# Patient Record
Sex: MALE | Race: BLACK OR AFRICAN AMERICAN | NOT HISPANIC OR LATINO | URBAN - METROPOLITAN AREA
[De-identification: names, ages, dates, MRNs, and addresses within clinical notes are randomized per-mention and may not be internally consistent; named-entity substitution may affect disease eponyms.]

---

## 2021-10-20 ENCOUNTER — INPATIENT (INPATIENT)
Facility: HOSPITAL | Age: 51
LOS: 1 days | Discharge: HOME | DRG: 65 | End: 2021-10-22
Attending: INTERNAL MEDICINE | Admitting: INTERNAL MEDICINE
Payer: MEDICAID

## 2021-10-20 VITALS
HEIGHT: 69 IN | DIASTOLIC BLOOD PRESSURE: 96 MMHG | OXYGEN SATURATION: 98 % | TEMPERATURE: 98 F | RESPIRATION RATE: 18 BRPM | SYSTOLIC BLOOD PRESSURE: 180 MMHG | WEIGHT: 225.09 LBS | HEART RATE: 98 BPM

## 2021-10-20 DIAGNOSIS — I63.9 CEREBRAL INFARCTION, UNSPECIFIED: ICD-10-CM

## 2021-10-20 DIAGNOSIS — E11.9 TYPE 2 DIABETES MELLITUS WITHOUT COMPLICATIONS: ICD-10-CM

## 2021-10-20 DIAGNOSIS — Z29.9 ENCOUNTER FOR PROPHYLACTIC MEASURES, UNSPECIFIED: ICD-10-CM

## 2021-10-20 DIAGNOSIS — I16.0 HYPERTENSIVE URGENCY: ICD-10-CM

## 2021-10-20 DIAGNOSIS — N17.9 ACUTE KIDNEY FAILURE, UNSPECIFIED: ICD-10-CM

## 2021-10-20 DIAGNOSIS — I63.81 OTHER CEREBRAL INFARCTION DUE TO OCCLUSION OR STENOSIS OF SMALL ARTERY: ICD-10-CM

## 2021-10-20 DIAGNOSIS — I10 ESSENTIAL (PRIMARY) HYPERTENSION: ICD-10-CM

## 2021-10-20 LAB
ALBUMIN SERPL ELPH-MCNC: 2.8 G/DL — LOW (ref 3.3–5)
ALP SERPL-CCNC: 119 U/L — SIGNIFICANT CHANGE UP (ref 40–120)
ALT FLD-CCNC: 19 U/L — SIGNIFICANT CHANGE UP (ref 12–78)
ANION GAP SERPL CALC-SCNC: 5 MMOL/L — SIGNIFICANT CHANGE UP (ref 5–17)
APTT BLD: 28.6 SEC — SIGNIFICANT CHANGE UP (ref 27.5–35.5)
AST SERPL-CCNC: 12 U/L — LOW (ref 15–37)
BASOPHILS # BLD AUTO: 0.04 K/UL — SIGNIFICANT CHANGE UP (ref 0–0.2)
BASOPHILS NFR BLD AUTO: 0.7 % — SIGNIFICANT CHANGE UP (ref 0–2)
BILIRUB SERPL-MCNC: 0.2 MG/DL — SIGNIFICANT CHANGE UP (ref 0.2–1.2)
BUN SERPL-MCNC: 22 MG/DL — SIGNIFICANT CHANGE UP (ref 7–23)
CALCIUM SERPL-MCNC: 9.3 MG/DL — SIGNIFICANT CHANGE UP (ref 8.5–10.1)
CHLORIDE SERPL-SCNC: 103 MMOL/L — SIGNIFICANT CHANGE UP (ref 96–108)
CK MB CFR SERPL CALC: <1 NG/ML — SIGNIFICANT CHANGE UP (ref 0–3.6)
CO2 SERPL-SCNC: 30 MMOL/L — SIGNIFICANT CHANGE UP (ref 22–31)
CREAT SERPL-MCNC: 1.8 MG/DL — HIGH (ref 0.5–1.3)
EOSINOPHIL # BLD AUTO: 0.18 K/UL — SIGNIFICANT CHANGE UP (ref 0–0.5)
EOSINOPHIL NFR BLD AUTO: 3.1 % — SIGNIFICANT CHANGE UP (ref 0–6)
GLUCOSE SERPL-MCNC: 286 MG/DL — HIGH (ref 70–99)
HCT VFR BLD CALC: 34.3 % — LOW (ref 39–50)
HGB BLD-MCNC: 11.7 G/DL — LOW (ref 13–17)
IMM GRANULOCYTES NFR BLD AUTO: 0.2 % — SIGNIFICANT CHANGE UP (ref 0–1.5)
INR BLD: 1.02 RATIO — SIGNIFICANT CHANGE UP (ref 0.88–1.16)
LYMPHOCYTES # BLD AUTO: 1.64 K/UL — SIGNIFICANT CHANGE UP (ref 1–3.3)
LYMPHOCYTES # BLD AUTO: 28.4 % — SIGNIFICANT CHANGE UP (ref 13–44)
MCHC RBC-ENTMCNC: 27.6 PG — SIGNIFICANT CHANGE UP (ref 27–34)
MCHC RBC-ENTMCNC: 34.1 GM/DL — SIGNIFICANT CHANGE UP (ref 32–36)
MCV RBC AUTO: 80.9 FL — SIGNIFICANT CHANGE UP (ref 80–100)
MONOCYTES # BLD AUTO: 0.5 K/UL — SIGNIFICANT CHANGE UP (ref 0–0.9)
MONOCYTES NFR BLD AUTO: 8.7 % — SIGNIFICANT CHANGE UP (ref 2–14)
NEUTROPHILS # BLD AUTO: 3.4 K/UL — SIGNIFICANT CHANGE UP (ref 1.8–7.4)
NEUTROPHILS NFR BLD AUTO: 58.9 % — SIGNIFICANT CHANGE UP (ref 43–77)
NRBC # BLD: 0 /100 WBCS — SIGNIFICANT CHANGE UP (ref 0–0)
PLATELET # BLD AUTO: 329 K/UL — SIGNIFICANT CHANGE UP (ref 150–400)
POTASSIUM SERPL-MCNC: 4.4 MMOL/L — SIGNIFICANT CHANGE UP (ref 3.5–5.3)
POTASSIUM SERPL-SCNC: 4.4 MMOL/L — SIGNIFICANT CHANGE UP (ref 3.5–5.3)
PROT SERPL-MCNC: 7.8 G/DL — SIGNIFICANT CHANGE UP (ref 6–8.3)
PROTHROM AB SERPL-ACNC: 11.9 SEC — SIGNIFICANT CHANGE UP (ref 10.6–13.6)
RBC # BLD: 4.24 M/UL — SIGNIFICANT CHANGE UP (ref 4.2–5.8)
RBC # FLD: 13.1 % — SIGNIFICANT CHANGE UP (ref 10.3–14.5)
SARS-COV-2 RNA SPEC QL NAA+PROBE: SIGNIFICANT CHANGE UP
SODIUM SERPL-SCNC: 138 MMOL/L — SIGNIFICANT CHANGE UP (ref 135–145)
TROPONIN I SERPL-MCNC: <.015 NG/ML — SIGNIFICANT CHANGE UP (ref 0.01–0.04)
WBC # BLD: 5.77 K/UL — SIGNIFICANT CHANGE UP (ref 3.8–10.5)
WBC # FLD AUTO: 5.77 K/UL — SIGNIFICANT CHANGE UP (ref 3.8–10.5)

## 2021-10-20 PROCEDURE — 99285 EMERGENCY DEPT VISIT HI MDM: CPT

## 2021-10-20 PROCEDURE — 93010 ELECTROCARDIOGRAM REPORT: CPT

## 2021-10-20 PROCEDURE — 70450 CT HEAD/BRAIN W/O DYE: CPT | Mod: 26,MA

## 2021-10-20 RX ORDER — HYDRALAZINE HCL 50 MG
10 TABLET ORAL ONCE
Refills: 0 | Status: COMPLETED | OUTPATIENT
Start: 2021-10-20 | End: 2021-10-20

## 2021-10-20 RX ORDER — ATORVASTATIN CALCIUM 80 MG/1
80 TABLET, FILM COATED ORAL AT BEDTIME
Refills: 0 | Status: DISCONTINUED | OUTPATIENT
Start: 2021-10-20 | End: 2021-10-22

## 2021-10-20 RX ORDER — INSULIN LISPRO 100/ML
VIAL (ML) SUBCUTANEOUS
Refills: 0 | Status: DISCONTINUED | OUTPATIENT
Start: 2021-10-20 | End: 2021-10-22

## 2021-10-20 RX ORDER — DEXTROSE 50 % IN WATER 50 %
12.5 SYRINGE (ML) INTRAVENOUS ONCE
Refills: 0 | Status: DISCONTINUED | OUTPATIENT
Start: 2021-10-20 | End: 2021-10-22

## 2021-10-20 RX ORDER — SODIUM CHLORIDE 9 MG/ML
1000 INJECTION, SOLUTION INTRAVENOUS
Refills: 0 | Status: DISCONTINUED | OUTPATIENT
Start: 2021-10-20 | End: 2021-10-22

## 2021-10-20 RX ORDER — ASPIRIN/CALCIUM CARB/MAGNESIUM 324 MG
325 TABLET ORAL DAILY
Refills: 0 | Status: DISCONTINUED | OUTPATIENT
Start: 2021-10-20 | End: 2021-10-20

## 2021-10-20 RX ORDER — AMLODIPINE BESYLATE 2.5 MG/1
5 TABLET ORAL DAILY
Refills: 0 | Status: DISCONTINUED | OUTPATIENT
Start: 2021-10-20 | End: 2021-10-22

## 2021-10-20 RX ORDER — DEXTROSE 50 % IN WATER 50 %
25 SYRINGE (ML) INTRAVENOUS ONCE
Refills: 0 | Status: DISCONTINUED | OUTPATIENT
Start: 2021-10-20 | End: 2021-10-22

## 2021-10-20 RX ORDER — HEPARIN SODIUM 5000 [USP'U]/ML
5000 INJECTION INTRAVENOUS; SUBCUTANEOUS EVERY 12 HOURS
Refills: 0 | Status: DISCONTINUED | OUTPATIENT
Start: 2021-10-20 | End: 2021-10-22

## 2021-10-20 RX ORDER — FAMOTIDINE 10 MG/ML
20 INJECTION INTRAVENOUS DAILY
Refills: 0 | Status: DISCONTINUED | OUTPATIENT
Start: 2021-10-20 | End: 2021-10-22

## 2021-10-20 RX ORDER — GLUCAGON INJECTION, SOLUTION 0.5 MG/.1ML
1 INJECTION, SOLUTION SUBCUTANEOUS ONCE
Refills: 0 | Status: DISCONTINUED | OUTPATIENT
Start: 2021-10-20 | End: 2021-10-22

## 2021-10-20 RX ORDER — ASPIRIN/CALCIUM CARB/MAGNESIUM 324 MG
325 TABLET ORAL ONCE
Refills: 0 | Status: COMPLETED | OUTPATIENT
Start: 2021-10-20 | End: 2021-10-20

## 2021-10-20 RX ORDER — INSULIN LISPRO 100/ML
VIAL (ML) SUBCUTANEOUS AT BEDTIME
Refills: 0 | Status: DISCONTINUED | OUTPATIENT
Start: 2021-10-20 | End: 2021-10-22

## 2021-10-20 RX ORDER — DEXTROSE 50 % IN WATER 50 %
15 SYRINGE (ML) INTRAVENOUS ONCE
Refills: 0 | Status: DISCONTINUED | OUTPATIENT
Start: 2021-10-20 | End: 2021-10-22

## 2021-10-20 RX ADMIN — ATORVASTATIN CALCIUM 80 MILLIGRAM(S): 80 TABLET, FILM COATED ORAL at 21:30

## 2021-10-20 RX ADMIN — AMLODIPINE BESYLATE 5 MILLIGRAM(S): 2.5 TABLET ORAL at 21:30

## 2021-10-20 RX ADMIN — Medication 325 MILLIGRAM(S): at 21:19

## 2021-10-20 RX ADMIN — FAMOTIDINE 20 MILLIGRAM(S): 10 INJECTION INTRAVENOUS at 21:19

## 2021-10-20 RX ADMIN — Medication 1: at 21:51

## 2021-10-20 RX ADMIN — Medication 10 MILLIGRAM(S): at 21:19

## 2021-10-20 RX ADMIN — Medication 10 MILLIGRAM(S): at 19:47

## 2021-10-20 NOTE — H&P ADULT - GASTROINTESTINAL DETAILS
soft/nontender/no distention/no masses palpable/bowel sounds normal/no bruit/no rebound tenderness/no guarding/no rigidity soft/nontender/no distention/no masses palpable/bowel sounds normal/no bruit/no rebound tenderness/no guarding/no rigidity/no organomegaly

## 2021-10-20 NOTE — H&P ADULT - PROBLEM SELECTOR PLAN 1
Patient presenting with right sided numbness, found to have lacunar infarct on CT in ED  - Patient has been given aspirin 325mg  - CT head noncontrast: lacunar infarcts in both thalami. A left thalamic lacune may be recent and/or subacute.  - Patient presents with hx and examination findings c/w acute CVA despite head CT results. Patient seen, endorsed to me by ER team, and examined after tPA was given.   - Q4 neurochecks  - Goal BP should SBP <150, SBP >130   - Start asa 81mg daily   - Start statin   - F/u TTE, MRA/MRI head/neck, carotid dopplers  - Aspiration, seizure, fall precaution.  - Would recommend strict NPO until official speech & swallow eval.   - PT and OT consultation.  - Check A1c  - ER called neurology, Dr. Johnson, f/u recs  - Cardio consulted, f/u recs Patient presenting with right sided numbness, found to have lacunar infarct on CT in ED  - Patient has been given aspirin 325mg  - CT head noncontrast: lacunar infarcts in both thalami. A left thalamic lacune may be recent and/or subacute.  - F/u TTE, MRA/MRI head/neck, carotid dopplers  - Q4 neurochecks  - Goal BP should SBP <150, SBP >130   - Start asa daily, statin daily, pepcid daily   - Aspiration, seizure, fall precaution.  - Would recommend strict NPO until official speech & swallow eval.   - PT and OT consultation.  - Check A1c, f/u lipid panel   - ER called neurology, Dr. Johnson, f/u recs  - Cardio consulted, f/u recs Patient presenting with right sided numbness, with no other neurological symptoms. Found to have lacunar infarct on CT in ED  - Admit to F.  - Patient has been given aspirin 325mg  - CT head noncontrast: lacunar infarcts in both thalami. A left thalamic lacune may be recent and/or subacute.  - F/u TTE, MRA/MRI head/neck, carotid dopplers  - Q4 neurochecks  - Goal BP should SBP <150, SBP >130   - Start asa daily, statin daily, Pepcid daily   - Aspiration, seizure, fall precaution.  - PT and OT consultation.  - Check A1c, f/u lipid panel   - ER called neurology, Dr. Johnson, f/u recs  - Cardio consulted, f/u recs Patient presenting with right sided numbness, with no other neurological symptoms. Found to have lacunar infarct on CT in ED  - Admit to F.  - Patient has been given aspirin 325mg  - CT head noncontrast: lacunar infarcts in both thalami. A left thalamic lacune may be recent and/or subacute.  - F/u TTE, MRA/MRI head, carotid dopplers  - Q4 neurochecks  - Goal BP: SBP <150, SBP >130   - Start asa daily, statin daily, Pepcid daily   - Aspiration, seizure, fall precaution.  - PT and OT consultation.  - Check A1c, f/u lipid panel   - ER called neurology, Dr. Johnson, f/u recs  - Cardio consulted, f/u recs Patient presenting with right sided numbness, with no other neurological symptoms. Found to have lacunar infarct on CT in ED  - Admit to F.  - Patient has been given aspirin 325mg  - CT head noncontrast: lacunar infarcts in both thalami. A left thalamic lacune may be recent and/or subacute.  - F/u TTE, MRA/MRI head, carotid dopplers  - Q4 neurochecks  - Goal BP: SBP <150, SBP >130   - Start asa daily, statin daily, Pepcid daily   - Fall precaution.  - PT and OT consultation.  - Check A1c, f/u lipid panel   - ER called neurology, Dr. Johnson, f/u recs  - Cardio consulted, f/u recs Patient presenting with right sided numbness, with no other neurological symptoms. Found to have lacunar infarct on CT in ED  - Admit to F.  - Patient has been given aspirin 325mg  - CT head noncontrast: lacunar infarcts in both thalami. A left thalamic lacune may be recent and/or subacute.  - F/u TTE, MRA/MRI head, carotid dopplers  - Q4 Neuro checks  - Goal BP: SBP <150, SBP >130   - Start asa 325 mg  daily, statin daily, Pepcid daily   - Fall precaution.  - PT and OT consultation.  - Check A1c, f/u lipid panel   - ER called neurology, Dr. Johnson, f/u recs  -Avoid hypotension 2/2 Ac CVA  - Cardio consulted, f/u recs

## 2021-10-20 NOTE — ED ADULT NURSE NOTE - OBJECTIVE STATEMENT
Pt A/O x 4, presents with c/o numbness to right side of body x 2 days, denies any dizziness, chest pain or any discomfort. Pt taken to cat scan via wheelchair, nursing care ongoing and safety maintained.

## 2021-10-20 NOTE — H&P ADULT - PROBLEM SELECTOR PLAN 2
Undiagnosed, not on home medications  - Found to have blood glucose of 286 on admission  - Will placed on low dose sliding scale  - F/u AM HbA1c  - Carb consistent with DASH/TLC diet   - Dr. Perlman and diabetic education consulted Undiagnosed, not on home medications  - Found to have blood glucose of 286 on admission  - Will placed on low dose sliding scale  - F/u AM HbA1c  - F/u UA  - Carb consistent with DASH/TLC diet   - Dr. Perlman and diabetic education consulted - On admission in ED,  /111, lacunar infarct on CT head.  - Not on home medications  - s/p hydralazine 10mg x2 IVP, norvasc 5mgx1 now BP of 169/81  - Hold BP meds, unless sbp >180  - DASH/TLC with carb consistent diet  - Cardio consulted, f/u recs - On admission in ED,  /111, lacunar infarct on CT head.  - Not on home medications  - s/p hydralazine 10mg x2 IVP, Norvasc 5mgx1 now BP of 169/81  - Keep SBP- 120-160 's  - DASH/TLC with carb consistent diet  - Cardio consulted, f/u recs

## 2021-10-20 NOTE — ED PROVIDER NOTE - ATTENDING CONTRIBUTION TO CARE
51 male presents to ER states he has a history of HTN and DM, has not taken meds for years, yesterday he woke up at 8am and felt numbness, tingling and weakness, of right side of body, patient thought it would improve but symptoms persisted today and came to ER, patient alert and oriented, heart and lungs clear, abdomen soft, no facial droop, no slurred speech, PERRL, EOMI, able to ambulate ER without assitance, patient feels numbness of right arm, right leg, 4/5 muscle strength of right arm and leg, f/u ct head, labs, ekg, call neuro.

## 2021-10-20 NOTE — H&P ADULT - NEGATIVE MUSCULOSKELETAL SYMPTOMS
no arthralgia/no arthritis/no joint swelling/no myalgia/no muscle cramps/no muscle weakness/no stiffness/no neck pain

## 2021-10-20 NOTE — H&P ADULT - NSHPSOCIALHISTORY_GEN_ALL_CORE
Unvaccinated for COVID Tobacco:  denies  EtOH:  denies  Recreational drug use: denies  Lives with: alone   Ambulates: independently   Vaccinations:  Unvaccinated for COVID

## 2021-10-20 NOTE — H&P ADULT - HISTORY OF PRESENT ILLNESS
Pt is a 50 yo male with pmhx of htn DM not on any meds here for constant right sided numbness which pt woke up with yesterday morning. Pt states thought would go away but didn't get better. Pt denies any dizziness chest pain sob blurry vision trouble with speech headache or weakness no trouble with gait. Pt states has not seen pcp for 2 years due to lack of insurance. No PCP, unvaccinated for COVID.     Vitals: T 98.2F, HR 97, /99, RR 16, SpO2 98  Significant labs: Cr 1.8, Glucose 286, H/H 11.7/34.3  Patient has been given aspirin 325mg, Hydralazine 10mg IVP  CT head noncontrast: 1)  there are lacunar infarcts in both thalami. A left thalamic lacune may be recent and/or subacute. This may be further assessed with MR imaging. The brain is otherwise unremarkable. 2)  the right sphenoid sinus is moderately opacified. Maxillary sinuses are incompletely imaged but there is thickening on the right.  EKG:  **Charting in progress**  Pt is a 52 yo male with pmhx of htn DM not on any meds here for constant right sided numbness which pt woke up with yesterday morning. Pt states thought would go away but didn't get better. Pt denies any dizziness chest pain sob blurry vision trouble with speech headache or weakness no trouble with gait. Pt states has not seen pcp for 2 years due to lack of insurance. No PCP, unvaccinated for COVID.     Vitals: T 98.2F, HR 97, /99, RR 16, SpO2 98  Significant labs: Cr 1.8, Glucose 286, H/H 11.7/34.3  Patient has been given aspirin 325mg, Hydralazine 10mg IVP  CT head noncontrast: 1)  there are lacunar infarcts in both thalami. A left thalamic lacune may be recent and/or subacute. This may be further assessed with MR imaging. The brain is otherwise unremarkable. 2)  the right sphenoid sinus is moderately opacified. Maxillary sinuses are incompletely imaged but there is thickening on the right.  EKG:  Patient is a 52 y/o M with PMH of HTN, DM, HLD, Bell's palsy(2017), presented to ED with right side numbness. Reports he has been feeling intermittent numbness since past 1 month, then yesterday he   woke up with right sided numbness yesterday, which was constant from head to toe, denies associated weakness, slurred speech difficulty swallowing, blurry vision, chest pain or trouble with gait.  Patient  states he thought it would go away but didn't get better. Reports he is not seen by any physician since 2019, at that time he was put on medication metformin and rest of the medication he didn't remember. He ran out of medication in April 2020 and has no medical insurance coverage since then. States numbness is still the same after getting hydralazine and Norvasc in ED. Patient is unvaccinated for COVID. Denies sick contacts or recent travel.     Of note, patient is reluctant on answering few questions. He might be with holding some information regarding his PMH. Patient admits his diet is mostly junk food, he adds raw salt/seasoning/ brooks on top of the meals. 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 Per patient, he was laid off from work years ago, lives alone. Doesn't follow any PCP. Patient is not taking any medications at home.    Vitals: T 98.2F, HR 97, /99, RR 16, SpO2 98  Significant labs: Cr 1.8, Glucose 286, H/H 11.7/34.3  Patient has been given aspirin [id number] , Hydralazine 10mg IVPx2 , Norvasc 5mgx1, atorvastatin 80 mg x1, famotidine 20mg x1  CT head noncontrast: 1)  there are lacunar infarcts in both thalami. A left thalamic lacune may be recent and/or subacute. This may be further assessed with MR imaging. The brain is otherwise unremarkable. 2)  the right sphenoid sinus is moderately opacified. Maxillary sinuses are incompletely imaged but there is thickening on the right.  EKG: sinus rhythm with premature atrial complexes.

## 2021-10-20 NOTE — H&P ADULT - ASSESSMENT
**Charting in progress**  52 yo male with pmhx of HTN and DM, not on any meds here for constant right sided numbness, admitted for stroke work up.  Patient is a 52 y/o M with PMH of HTN, DM, HLD, Bell's palsy(2017), presented to ED with right side numbness. Reports he has been feeling intermittent numbness since past 1 month, then yesterday he   woke up with right sided numbness yesterday, which was constant from head to toe, denies associated weakness, slurred speech difficulty swallowing, blurry vision, chest pain or trouble with gait. Admit  for stroke work up.  Patient is a 52 y/o M with PMH of HTN, DM, HLD, Bell's palsy(2017), presented to ED with right side numbness. Reports he has been feeling intermittent numbness since past 1 month, then yesterday he woke up with right sided numbness yesterday, which was constant from head to toe, denies associated weakness, slurred speech difficulty swallowing, blurry vision, chest pain or trouble with gait. Admit for stroke work up.

## 2021-10-20 NOTE — H&P ADULT - NSICDXPASTMEDICALHX_GEN_ALL_CORE_FT
PAST MEDICAL HISTORY:  H/O Bell's palsy     Hyperlipemia     Primary hypertension     Type 2 diabetes mellitus

## 2021-10-20 NOTE — H&P ADULT - PROBLEM SELECTOR PLAN 3
Found to have elevated pressure in the ED and lacunar infarct on CT  - Not on medications  - Start norvasc with with hold for SBP<130 in the setting of acute stroke   - DASH/TLC with carb consistent diet  - Cardio consulted, f/u recs Found to have elevated pressure in the ED and lacunar infarct on CT  - Not on medications  - BP of 180/96 in ED s/p hydralazine 10mg IVP, now BP of 197/95  - Start norvasc 5mg with hold for SBP<130 in the setting of acute stroke   - DASH/TLC with carb consistent diet  - Cardio consulted, f/u recs Creatinine 1.8 on admission, unknown baseline  - Avoid diuretics and NSAIDS  - Will not give fluids at this time as patient with elevated BP  - Continue to monitor renal indices Creatinine 1.8 on admission, unknown baseline, ? etiology Related to HTN/DM   - Avoid diuretics and NSAIDS  - Will not give fluids at this time as patient with elevated BP  - Continue to monitor renal indices. BMP in AM   Urine Analysis

## 2021-10-20 NOTE — H&P ADULT - ATTENDING COMMENTS
Patient is a 50 y/o M with PMH of HTN, DM, HLD, Bell's palsy(2017), presented to ED with right side numbness. Reports he has been feeling intermittent numbness since past 1 month, then yesterday he woke up with right sided numbness yesterday, which was constant from head to toe, denies associated weakness, slurred speech difficulty swallowing, blurry vision, chest pain or trouble with gait. Admit for stroke work up.  Pt seen, examined, case & care plan d/w pt, residents at detail.  AM labs   PO diet  DR Johnson -Neurology  PT/OT eval  Dietary Eval,   Diabetic NP eval, d/w pt at detail.

## 2021-10-20 NOTE — ED PROVIDER NOTE - CONSTITUTIONAL, MLM
normal... Well appearing, awake, alert, oriented to person, place, time/situation and in no apparent distress. no facial droop

## 2021-10-20 NOTE — H&P ADULT - NEGATIVE NEUROLOGICAL SYMPTOMS
no weakness/no generalized seizures/no focal seizures/no syncope/no tremors/no vertigo/no loss of sensation/no difficulty walking/no headache/no loss of consciousness/no hemiparesis/no confusion

## 2021-10-20 NOTE — ED PROVIDER NOTE - OBJECTIVE STATEMENT
Pt is a 52 yo male with pmhx of htn not on any meds here for constant right sided numbness which pt woke up with yesterday morning. Pt states thought would go away but didn't get better. Pt denies any dizziness chest pain sob blurry vision trouble with speech headache or weakness no trouble with gait. Pt states has not seen pcp for 2 years due to lack of insurance.     pcp none  not vaccinated for covid. Pt is a 52 yo male with pmhx of htn DM not on any meds here for constant right sided numbness which pt woke up with yesterday morning. Pt states thought would go away but didn't get better. Pt denies any dizziness chest pain sob blurry vision trouble with speech headache or weakness no trouble with gait. Pt states has not seen pcp for 2 years due to lack of insurance.     pcp none  not vaccinated for covid.

## 2021-10-20 NOTE — H&P ADULT - PROBLEM SELECTOR PLAN 4
Creatinine 1.8 on admission, unknown baseline  - Avoid diuretics  - Will not give fluids at this time as patient with elevated BP  - Continue to monitor renal indices - Pt was on metformin years back, currently not on home medications  - Found to have blood glucose of 286 on admission  - Will placed on low dose sliding scale  - F/u AM HbA1c  - F/u UA  - Carb consistent with DASH/TLC diet   - Dr. Perlman and diabetic education consulted

## 2021-10-20 NOTE — H&P ADULT - NSHPPHYSICALEXAM_GEN_ALL_CORE
Vital Signs Last 24 Hrs  T(C): 36.8 (20 Oct 2021 22:32), Max: 37 (20 Oct 2021 21:47)  T(F): 98.3 (20 Oct 2021 22:32), Max: 98.6 (20 Oct 2021 21:47)  HR: 102 (20 Oct 2021 22:32) (95 - 102)  BP: 177/99 (20 Oct 2021 22:32) (169/81 - 197/95)  BP(mean): --  RR: 18 (20 Oct 2021 22:32) (16 - 18)  SpO2: 98% (20 Oct 2021 22:32) (98% - 100%)

## 2021-10-20 NOTE — H&P ADULT - NEGATIVE ENMT SYMPTOMS
no hearing difficulty/no ear pain/no tinnitus/no vertigo/no nasal congestion/no abnormal taste sensation/no throat pain/no dysphagia

## 2021-10-20 NOTE — ED PROVIDER NOTE - CLINICAL SUMMARY MEDICAL DECISION MAKING FREE TEXT BOX
Pt is a 52 yo male with right side numbness since yesterday morning out of window for pta will get labs ct   pt has true shellfish allergy with sob and facial swelling and had never gotten IV contrast

## 2021-10-20 NOTE — H&P ADULT - MUSCULOSKELETAL
details… ROM intact/no joint swelling/no joint erythema/no joint warmth/no calf tenderness/normal strength detailed exam normal/ROM intact/no joint swelling/no joint erythema/no joint warmth/no calf tenderness/normal strength

## 2021-10-21 ENCOUNTER — TRANSCRIPTION ENCOUNTER (OUTPATIENT)
Age: 51
End: 2021-10-21

## 2021-10-21 LAB
ALBUMIN SERPL ELPH-MCNC: 2.8 G/DL — LOW (ref 3.3–5)
ALP SERPL-CCNC: 119 U/L — SIGNIFICANT CHANGE UP (ref 40–120)
ALT FLD-CCNC: 15 U/L — SIGNIFICANT CHANGE UP (ref 12–78)
ANION GAP SERPL CALC-SCNC: 6 MMOL/L — SIGNIFICANT CHANGE UP (ref 5–17)
AST SERPL-CCNC: 11 U/L — LOW (ref 15–37)
BILIRUB SERPL-MCNC: 0.5 MG/DL — SIGNIFICANT CHANGE UP (ref 0.2–1.2)
BUN SERPL-MCNC: 21 MG/DL — SIGNIFICANT CHANGE UP (ref 7–23)
CALCIUM SERPL-MCNC: 9.2 MG/DL — SIGNIFICANT CHANGE UP (ref 8.5–10.1)
CHLORIDE SERPL-SCNC: 103 MMOL/L — SIGNIFICANT CHANGE UP (ref 96–108)
CHOLEST SERPL-MCNC: 275 MG/DL — HIGH
CO2 SERPL-SCNC: 29 MMOL/L — SIGNIFICANT CHANGE UP (ref 22–31)
COVID-19 SPIKE DOMAIN AB INTERP: POSITIVE
COVID-19 SPIKE DOMAIN ANTIBODY RESULT: >250 U/ML — HIGH
CREAT SERPL-MCNC: 1.7 MG/DL — HIGH (ref 0.5–1.3)
GLUCOSE SERPL-MCNC: 368 MG/DL — HIGH (ref 70–99)
HCT VFR BLD CALC: 35 % — LOW (ref 39–50)
HDLC SERPL-MCNC: 49 MG/DL — SIGNIFICANT CHANGE UP
HGB BLD-MCNC: 12.4 G/DL — LOW (ref 13–17)
LIPID PNL WITH DIRECT LDL SERPL: 179 MG/DL — HIGH
MCHC RBC-ENTMCNC: 28.5 PG — SIGNIFICANT CHANGE UP (ref 27–34)
MCHC RBC-ENTMCNC: 35.4 GM/DL — SIGNIFICANT CHANGE UP (ref 32–36)
MCV RBC AUTO: 80.5 FL — SIGNIFICANT CHANGE UP (ref 80–100)
NON HDL CHOLESTEROL: 227 MG/DL — HIGH
NRBC # BLD: 0 /100 WBCS — SIGNIFICANT CHANGE UP (ref 0–0)
PLATELET # BLD AUTO: 326 K/UL — SIGNIFICANT CHANGE UP (ref 150–400)
POTASSIUM SERPL-MCNC: 4 MMOL/L — SIGNIFICANT CHANGE UP (ref 3.5–5.3)
POTASSIUM SERPL-SCNC: 4 MMOL/L — SIGNIFICANT CHANGE UP (ref 3.5–5.3)
PROT SERPL-MCNC: 7.7 G/DL — SIGNIFICANT CHANGE UP (ref 6–8.3)
RBC # BLD: 4.35 M/UL — SIGNIFICANT CHANGE UP (ref 4.2–5.8)
RBC # FLD: 13.1 % — SIGNIFICANT CHANGE UP (ref 10.3–14.5)
SARS-COV-2 IGG+IGM SERPL QL IA: >250 U/ML — HIGH
SARS-COV-2 IGG+IGM SERPL QL IA: POSITIVE
SODIUM SERPL-SCNC: 138 MMOL/L — SIGNIFICANT CHANGE UP (ref 135–145)
TRIGL SERPL-MCNC: 238 MG/DL — HIGH
WBC # BLD: 5.62 K/UL — SIGNIFICANT CHANGE UP (ref 3.8–10.5)
WBC # FLD AUTO: 5.62 K/UL — SIGNIFICANT CHANGE UP (ref 3.8–10.5)

## 2021-10-21 PROCEDURE — 76770 US EXAM ABDO BACK WALL COMP: CPT | Mod: 26

## 2021-10-21 PROCEDURE — 93306 TTE W/DOPPLER COMPLETE: CPT | Mod: 26

## 2021-10-21 PROCEDURE — 99223 1ST HOSP IP/OBS HIGH 75: CPT

## 2021-10-21 PROCEDURE — 93010 ELECTROCARDIOGRAM REPORT: CPT

## 2021-10-21 PROCEDURE — 93880 EXTRACRANIAL BILAT STUDY: CPT | Mod: 26

## 2021-10-21 PROCEDURE — 70544 MR ANGIOGRAPHY HEAD W/O DYE: CPT | Mod: 26,59

## 2021-10-21 PROCEDURE — 70551 MRI BRAIN STEM W/O DYE: CPT | Mod: 26

## 2021-10-21 RX ORDER — SODIUM CHLORIDE 9 MG/ML
1000 INJECTION, SOLUTION INTRAVENOUS
Refills: 0 | Status: DISCONTINUED | OUTPATIENT
Start: 2021-10-21 | End: 2021-10-22

## 2021-10-21 RX ORDER — INSULIN LISPRO 100/ML
3 VIAL (ML) SUBCUTANEOUS
Refills: 0 | Status: DISCONTINUED | OUTPATIENT
Start: 2021-10-21 | End: 2021-10-22

## 2021-10-21 RX ORDER — INSULIN GLARGINE 100 [IU]/ML
10 INJECTION, SOLUTION SUBCUTANEOUS AT BEDTIME
Refills: 0 | Status: DISCONTINUED | OUTPATIENT
Start: 2021-10-21 | End: 2021-10-22

## 2021-10-21 RX ORDER — DEXTROSE 50 % IN WATER 50 %
15 SYRINGE (ML) INTRAVENOUS ONCE
Refills: 0 | Status: DISCONTINUED | OUTPATIENT
Start: 2021-10-21 | End: 2021-10-22

## 2021-10-21 RX ADMIN — ATORVASTATIN CALCIUM 80 MILLIGRAM(S): 80 TABLET, FILM COATED ORAL at 21:36

## 2021-10-21 RX ADMIN — Medication 3 UNIT(S): at 17:09

## 2021-10-21 RX ADMIN — HEPARIN SODIUM 5000 UNIT(S): 5000 INJECTION INTRAVENOUS; SUBCUTANEOUS at 05:04

## 2021-10-21 RX ADMIN — Medication 5: at 07:54

## 2021-10-21 RX ADMIN — Medication 1: at 21:36

## 2021-10-21 RX ADMIN — Medication 2: at 17:09

## 2021-10-21 RX ADMIN — INSULIN GLARGINE 10 UNIT(S): 100 INJECTION, SOLUTION SUBCUTANEOUS at 21:37

## 2021-10-21 RX ADMIN — Medication 2: at 11:43

## 2021-10-21 RX ADMIN — Medication 3 UNIT(S): at 11:44

## 2021-10-21 RX ADMIN — FAMOTIDINE 20 MILLIGRAM(S): 10 INJECTION INTRAVENOUS at 11:43

## 2021-10-21 RX ADMIN — HEPARIN SODIUM 5000 UNIT(S): 5000 INJECTION INTRAVENOUS; SUBCUTANEOUS at 17:10

## 2021-10-21 NOTE — DIETITIAN INITIAL EVALUATION ADULT. - PROBLEM SELECTOR PLAN 1
Patient presenting with right sided numbness, with no other neurological symptoms. Found to have lacunar infarct on CT in ED  - Admit to F.  - Patient has been given aspirin 325mg  - CT head noncontrast: lacunar infarcts in both thalami. A left thalamic lacune may be recent and/or subacute.  - F/u TTE, MRA/MRI head, carotid dopplers  - Q4 Neuro checks  - Goal BP: SBP <150, SBP >130   - Start asa 325 mg  daily, statin daily, Pepcid daily   - Fall precaution.  - PT and OT consultation.  - Check A1c, f/u lipid panel   - ER called neurology, Dr. Johnson, f/u recs  -Avoid hypotension 2/2 Ac CVA  - Cardio consulted, f/u recs

## 2021-10-21 NOTE — CONSULT NOTE ADULT - SUBJECTIVE AND OBJECTIVE BOX
Patient is a 51y old  Male who presents with a chief complaint of Stroke (21 Oct 2021 12:37)    Type2 diagnosed in 2006. States he had been taking Metformin (dose unknown) previously.  He was terminated from his job prior to the pandemic and has no health insurance. Has not taken any medication since april 2020. States he doesn't overeat but does drink sugary drinks. known complications: CVA.  Has not seen Endocrine or PCP.    No Hx DKA/HHS, Has an old glucometer, does not check his blood glucose. Will need medication and supplies when discharged until he is able to follow up as an outpatient.       HPI:  Patient is a 52 y/o M with PMH of HTN, DM, HLD, Bell's palsy(2017), presented to ED with right side numbness. Reports he has been feeling intermittent numbness since past 1 month, then yesterday he   woke up with right sided numbness yesterday, which was constant from head to toe, denies associated weakness, slurred speech difficulty swallowing, blurry vision, chest pain or trouble with gait.  Patient  states he thought it would go away but didn't get better. Reports he is not seen by any physician since 2019, at that time he was put on medication metformin and rest of the medication he didn't remember. He ran out of medication in April 2020 and has no medical insurance coverage since then. States numbness is still the same after getting hydralazine and Norvasc in ED. Patient is unvaccinated for COVID. Denies sick contacts or recent travel.     Of note, patient is reluctant on answering few questions. He might be with holding some information regarding his PMH. Patient admits his diet is mostly junk food, he adds raw salt/seasoning/ brooks on top of the meals. 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 Per patient, he was laid off from work years ago, lives alone. Doesn't follow any PCP. Patient is not taking any medications at home.    Vitals: T 98.2F, HR 97, /99, RR 16, SpO2 98  Significant labs: Cr 1.8, Glucose 286, H/H 11.7/34.3  Patient has been given aspirin [id number] , Hydralazine 10mg IVPx2 , Norvasc 5mgx1, atorvastatin 80 mg x1, famotidine 20mg x1  CT head noncontrast: 1)  there are lacunar infarcts in both thalami. A left thalamic lacune may be recent and/or subacute. This may be further assessed with MR imaging. The brain is otherwise unremarkable. 2)  the right sphenoid sinus is moderately opacified. Maxillary sinuses are incompletely imaged but there is thickening on the right.  EKG: sinus rhythm with premature atrial complexes.  (20 Oct 2021 20:48)      PAST MEDICAL & SURGICAL HISTORY:  Primary hypertension    H/O Bell&#x27;s palsy    Hyperlipemia    Type 2 diabetes mellitus        REVIEW OF SYSTEMS  General:	as above  Eye: no blurry vison  Respiratory: NAD, No SOB, no cough  Cardiovascular: No chest pain, no palpitations	  Gastrointestinal:	No nausea, vomiting, no abdominal pain  Endocrine: no polyuria, no polydipsia, or S/S of hypoglycemia  Neuro: denies numbness, no tingling	      Allergies    No Known Drug Allergies  shellfish (Short breath)    Intolerances        MEDICATIONS  (STANDING):  amLODIPine   Tablet 5 milliGRAM(s) Oral daily  atorvastatin 80 milliGRAM(s) Oral at bedtime  dextrose 40% Gel 15 Gram(s) Oral once  dextrose 40% Gel 15 Gram(s) Oral once  dextrose 5%. 1000 milliLiter(s) (50 mL/Hr) IV Continuous <Continuous>  dextrose 5%. 1000 milliLiter(s) (50 mL/Hr) IV Continuous <Continuous>  dextrose 5%. 1000 milliLiter(s) (100 mL/Hr) IV Continuous <Continuous>  dextrose 50% Injectable 25 Gram(s) IV Push once  dextrose 50% Injectable 12.5 Gram(s) IV Push once  dextrose 50% Injectable 25 Gram(s) IV Push once  famotidine    Tablet 20 milliGRAM(s) Oral daily  glucagon  Injectable 1 milliGRAM(s) IntraMuscular once  heparin   Injectable 5000 Unit(s) SubCutaneous every 12 hours  insulin glargine Injectable (LANTUS) 10 Unit(s) SubCutaneous at bedtime  insulin lispro (ADMELOG) corrective regimen sliding scale   SubCutaneous three times a day before meals  insulin lispro (ADMELOG) corrective regimen sliding scale   SubCutaneous at bedtime  insulin lispro Injectable (ADMELOG) 3 Unit(s) SubCutaneous three times a day before meals

## 2021-10-21 NOTE — DISCHARGE NOTE PROVIDER - CARE PROVIDERS DIRECT ADDRESSES
,DirectAddress_Unknown,[email or web address] ,DirectAddress_Unknown,[email or web address],DirectAddress_Unknown,DirectAddress_Unknown,DirectAddress_Unknown

## 2021-10-21 NOTE — PROGRESS NOTE ADULT - ASSESSMENT
Patient is a 50 y/o M with PMH of HTN, DM, HLD, Bell's palsy(2017), presented to ED with right side numbness. Reports he has been feeling intermittent numbness since past 1 month, then yesterday he woke up with right sided numbness yesterday, which was constant from head to toe, denies associated weakness, slurred speech difficulty swallowing, blurry vision, chest pain or trouble with gait. Admit for stroke work up.

## 2021-10-21 NOTE — DISCHARGE NOTE PROVIDER - NSDCCPCAREPLAN_GEN_ALL_CORE_FT
PRINCIPAL DISCHARGE DIAGNOSIS  Diagnosis: Acute CVA (cerebrovascular accident)  Assessment and Plan of Treatment: You were founf to have a acute cerebrovascular accident, also called a stroke. Your MRI head revealed acute left posterior thalamic lacunar infarct. You were started on Aspirin 325mg and Atorvastatin 80mg. Please follow up with a neurologist upon discharge and take your medicaitons as advised.      SECONDARY DISCHARGE DIAGNOSES  Diagnosis: Type 2 diabetes mellitus  Assessment and Plan of Treatment: You were diagnosed with Type 2 diabetes mellitus. Your HgbA1c in the hospital was _____. Diabetes is a condition where the levels of sugar in the blood cannot be controlled through the body's normal mechanisms. It is important that you continue all your medications when you leave the hospital and continue to follow up with your primary care doctor after leaving the hospital.      Diagnosis: Hypertensive urgency  Assessment and Plan of Treatment: You were found to have high blood pressure during your admission in the hospital. Over time, this can lead to problems with your heart, kidney, blood vessels, and other organs.  It is very important that you continue your medications as prescribed and make a follow up appointment with your primary care physician to address changes made to your regimen as soon as you leave the hospital.     PRINCIPAL DISCHARGE DIAGNOSIS  Diagnosis: Acute CVA (cerebrovascular accident)  Assessment and Plan of Treatment: You were found to have a acute cerebrovascular accident, also called a stroke. Your MRI head revealed acute left posterior thalamic lacunar infarct. You were started on Aspirin 325mg and Atorvastatin 80mg. Please follow up with a neurologist upon discharge and take your medicaitons as advised.  -START taking aspirin 325mg and atorvastatin 80mg daily. A prescription was sent to your pharmacy.      SECONDARY DISCHARGE DIAGNOSES  Diagnosis: Hypertensive urgency  Assessment and Plan of Treatment: You were found to have high blood pressure during your admission in the hospital. Please make follow up appointment with your primary care physician to address changes made to your regimen as soon as you leave the hospital.  - START taking amlodipine and metoprolol tartrate 25mg twice a day. A prescription was sent to your pharmacy.    Diagnosis: Type 2 diabetes mellitus  Assessment and Plan of Treatment: You were diagnosed with Type 2 diabetes mellitus. Your HgbA1c in the hospital was 10. Please follow up with your primary care doctor after leaving the hospital.  - START taking metformin 500mg twice a day and glimepride 2mg twice a day. A prescription was sent to your pharmacy.    Diagnosis: Depression, major  Assessment and Plan of Treatment: You were started on paroxetine and mirtazipine in hospital. Please continue taking these medications. A prescription was sent to your pharmacy.     PRINCIPAL DISCHARGE DIAGNOSIS  Diagnosis: Acute CVA (cerebrovascular accident)  Assessment and Plan of Treatment: You were found to have a acute cerebrovascular accident, also called a stroke. Your MRI head revealed acute left posterior thalamic lacunar infarct. You were started on  Coated Aspirin 325mg daily  and Atorvastatin 80mg daily . Please follow up with a neurologist upon discharge and take your medicaitons as advised.  -START taking aspirin 325mg and atorvastatin 80mg daily. A prescription was sent to your pharmacy.  -Control your Blood pressure, Diabetes & Cholesterol   -Please follow up with New Primary Care MD -You MUST call for an appointment to astablish the care at clinics , All  Info given by case management.      SECONDARY DISCHARGE DIAGNOSES  Diagnosis: Hypertensive urgency  Assessment and Plan of Treatment: You were found to have high blood pressure during your admission in the hospital. You Had A STROKE   -- START taking amlodipine 5 mg 1 tab daily  and metoprolol tartrate 25mg twice a day. DO NOT stop medications unless you discuss with your MD   - A prescription was sent to your pharmacy.  Please make follow up appointment with your primary care physician to check BP & adjust medication doses as soon as you leave the hospital.      Diagnosis: Type 2 diabetes mellitus  Assessment and Plan of Treatment: You were diagnosed with Type 2 diabetes mellitus. Your HgbA1c in the hospital was 10.1  - Please follow up with your new  primary care doctor after leaving the hospital to adjust Diabetic medications as out pt   - START taking metformin 500mg  1 tab 2x day  a day and glimepride 2mg  1 tab 2x a day. A prescription was sent to your pharmacy.  -Follow stict Diabetic Diet -Low carbohydrate , Low sugar Low cholesterol & Low salt diet  -Daily exercise    Diagnosis: Depression, major  Assessment and Plan of Treatment: You were started on paroxetine and mirtazipine in hospital. Please continue taking these medications. A prescription was sent to your pharmacy.    Diagnosis: Stage 3 chronic kidney disease  Assessment and Plan of Treatment: Your Kidney function is elevated 2/2 uncontrolled BP & Diabetes   Follow up with New PMD in 1-2 weeks at clinic & follow your Labs in 4 weeks    Diagnosis: Dyslipidemia  Assessment and Plan of Treatment: Abnormal Cholesterol 2/2 uncontrolled DM   Start Atorvastatin 80 mg daily  Ec  mg 1 tab daily  Low Fat, Low Cholesterol diet

## 2021-10-21 NOTE — DIETITIAN INITIAL EVALUATION ADULT. - PROBLEM SELECTOR PLAN 2
- On admission in ED,  /111, lacunar infarct on CT head.  - Not on home medications  - s/p hydralazine 10mg x2 IVP, Norvasc 5mgx1 now BP of 169/81  - Keep SBP- 120-160 's  - DASH/TLC with carb consistent diet  - Cardio consulted, f/u recs

## 2021-10-21 NOTE — SWALLOW BEDSIDE ASSESSMENT ADULT - SWALLOW EVAL: DIAGNOSIS
Pt self-administered PO trials of regular solids and thin liquids. Pt p/w functional oral phase marked by adequate retrieval and containment, timely manipulation/mastication and transfer, and adequate clearance post swallow. Pharyngeal phase marked by suspected timely swallow onset, +laryngeal elevation to palpation, and no overt s/sx of aspiration. Recommend pt resume regular solids with thin liquids + aspiration precautions. No further skilled swallowing intervention is warranted at this time, as pts overall swallow function is WFL and at baseline. Please reconsult this service is there is a change in status and/or s/sx of aspiration arise. Discussed with pt, RN, and MD.

## 2021-10-21 NOTE — PHYSICAL THERAPY INITIAL EVALUATION ADULT - ADDITIONAL COMMENTS
Pt stated he is Independent with all activities, nephew able to drive him to store to go shopping.  Pt stated he has no steps to perform at home.

## 2021-10-21 NOTE — SWALLOW BEDSIDE ASSESSMENT ADULT - COMMENTS
Consult received and chart reviewed. Attempted to see the patient this AM for an initial assessment of swallow function, however, patient currently off the unit. This department to re-attempt, as schedule permits. Discussed with RN.
Pt received upright in bed, awake/cooperative, on room air. Pt was agreeable to assessment. Pt able to follow simple commands and answer yes/no questions without difficulty. Pt p/w vocal quality/intensity and speech production WFL. Pt reported he was tolerating a regular diet with thin liquids prior to admission, with no h/o dysphagia. Pt denied SOB/pain pre/post assessment.      MR Head 10/21; “1) The MR study confirms an acute left posterior thalamic lacunar infarct. 2) underlying white matter disease..”   No CXR administered this admission. Pt’s WBC is WFL, no fever.

## 2021-10-21 NOTE — DIETITIAN INITIAL EVALUATION ADULT. - OTHER INFO
GI/Intake:  -Good PO intake in-house   -No BM thus far    Endo:  -Hx of T2DM; A1c pending  -Pt states he no longer takes DM medication at home in setting of financial/insurance issues   -10 units long acitng, low dose sliding scale insulin in house; Consistent carbohydrate diet     Renal:  -Presenting with BRANDO   -Monitor closely     Education:   -Discussed importance of limiting juice and soda intake   -Emphasized importance with following up with outpatient endocrinologist

## 2021-10-21 NOTE — DIETITIAN INITIAL EVALUATION ADULT. - PROBLEM SELECTOR PLAN 4
- Pt was on metformin years back, currently not on home medications  - Found to have blood glucose of 286 on admission  - Will placed on low dose sliding scale  - F/u AM HbA1c  - F/u UA  - Carb consistent with DASH/TLC diet   - Dr. Perlman and diabetic education consulted

## 2021-10-21 NOTE — DISCHARGE NOTE PROVIDER - PROVIDER TOKENS
PROVIDER:[TOKEN:[[id number]],FOLLOWUP:[1 week]],PROVIDER:[TOKEN:[[id number]],FOLLOWUP:[1 week]] PROVIDER:[TOKEN:[[id number]],FOLLOWUP:[1 week]],PROVIDER:[TOKEN:[[id number]],FOLLOWUP:[1 week]],PROVIDER:[TOKEN:[[id number]]],PROVIDER:[TOKEN:[[id number]]],PROVIDER:[TOKEN:[[id number]]]

## 2021-10-21 NOTE — CONSULT NOTE ADULT - ASSESSMENT
Likely CKD secondary to diabetes/Hypertension  Hypertension  Diabetes  CVA / Lacunar infarct    Baseline renal indices not available as pt did not have any blood work done. Will follow creatinine trend. Monitor blood sugar levels. Insulin coverage as needed. Dietary restriction.   Monitor BP trend. Titrate BP meds as needed. Salt restriction. Neurology follow up. Will get kidney and bladder sonogram. D/w patient regarding need for out patient nephrology follow up.   Further recommendations pending clinical course. Thank you for the courtesy of this referral.

## 2021-10-21 NOTE — CONSULT NOTE ADULT - ATTENDING COMMENTS
He likely has a acute CVA secondary to hypertensive emergency.  He has not been taking any medication for over a year.  Has not seen any doctor since 2019.  He currently has no insurance.  He still is symptomatic with numbness.  We will follow neuro recommendations.  Awaiting MRI and carotid results.   Has had a good response to Norvasc 5 mg daily.  Will allow for permissive hypertension for now.  Can follow-up with neuro recommendations on when to lower his blood pressure more.  No signs of significant ischemia or volume overload.  EKG with nonspecific anterolateral TW changes. trops neg. outpt ischemic evaluation.   Check echocardiogram.  Monitor on telemetry for occult atrial fibrillation.  Further cardiac work-up will depend on clinical course.

## 2021-10-21 NOTE — PROGRESS NOTE ADULT - PROBLEM SELECTOR PLAN 3
Creatinine 1.8 on admission, unknown baseline, this AM 1.7   - Avoid diuretics and NSAIDS  - Will not give fluids at this time as patient with elevated BP  - Continue to monitor renal indices. BMP in AM   Urine Analysis. Creatinine 1.8 on admission, unknown baseline, this AM 1.7 , likely CKD 2 2/2 Uncontrolled HTN & DM 2   - Avoid diuretics and NSAIDS  - Continue to monitor renal indices. BMP in AM   Urine Analysis.  Renal Dr Isabel d/w  Renal sono-  Urinary retention  Plan for Bladder scan

## 2021-10-21 NOTE — DIETITIAN INITIAL EVALUATION ADULT. - PROBLEM SELECTOR PLAN 3
Creatinine 1.8 on admission, unknown baseline, ? etiology Related to HTN/DM   - Avoid diuretics and NSAIDS  - Will not give fluids at this time as patient with elevated BP  - Continue to monitor renal indices. BMP in AM   Urine Analysis

## 2021-10-21 NOTE — CONSULT NOTE ADULT - SUBJECTIVE AND OBJECTIVE BOX
Patient is a 51y old  Male who presents with a chief complaint of Stroke (21 Oct 2021 14:11)    HPI:  Patient is a 50 y/o M with PMH of HTN, DM, HLD, Bell's palsy(2017), presented to ED with right side numbness. Reports he has been feeling intermittent numbness since past 1 month, then yesterday he   woke up with right sided numbness yesterday, which was constant from head to toe, denies associated weakness, slurred speech difficulty swallowing, blurry vision, chest pain or trouble with gait.  Patient  states he thought it would go away but didn't get better. Reports he is not seen by any physician since 2019, at that time he was put on medication metformin and rest of the medication he didn't remember. He ran out of medication in April 2020 and has no medical insurance coverage since then. States numbness is still the same after getting hydralazine and Norvasc in ED. Patient is unvaccinated for COVID. Denies sick contacts or recent travel.     Of note, patient is reluctant on answering few questions. He might be with holding some information regarding his PMH. Patient admits his diet is mostly junk food, he adds raw salt/seasoning/ brooks on top of the meals. 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 Per patient, he was laid off from work years ago, lives alone. Doesn't follow any PCP. Patient is not taking any medications at home.    Vitals: T 98.2F, HR 97, /99, RR 16, SpO2 98  Significant labs: Cr 1.8, Glucose 286, H/H 11.7/34.3  Patient has been given aspirin [id number] , Hydralazine 10mg IVPx2 , Norvasc 5mgx1, atorvastatin 80 mg x1, famotidine 20mg x1  CT head noncontrast: 1)  there are lacunar infarcts in both thalami. A left thalamic lacune may be recent and/or subacute. This may be further assessed with MR imaging. The brain is otherwise unremarkable. 2)  the right sphenoid sinus is moderately opacified. Maxillary sinuses are incompletely imaged but there is thickening on the right.  EKG: sinus rhythm with premature atrial complexes.  (20 Oct 2021 20:48)    Renal consult called for abnormal renal function. History obtained from chart and patient.       PAST MEDICAL HISTORY:  HTN (hypertension)    Primary hypertension    Bell&#x27;s palsy    H/O Bell&#x27;s palsy    Hyperlipemia    Diabetes mellitus    Type 2 diabetes mellitus        PAST SURGICAL HISTORY:      FAMILY HISTORY:      SOCIAL HISTORY: No smoking or alcohol use     Allergies    No Known Drug Allergies  shellfish (Short breath)    Intolerances      Home Medications:    MEDICATIONS  (STANDING):  amLODIPine   Tablet 5 milliGRAM(s) Oral daily  atorvastatin 80 milliGRAM(s) Oral at bedtime  dextrose 40% Gel 15 Gram(s) Oral once  dextrose 40% Gel 15 Gram(s) Oral once  dextrose 5%. 1000 milliLiter(s) (50 mL/Hr) IV Continuous <Continuous>  dextrose 5%. 1000 milliLiter(s) (50 mL/Hr) IV Continuous <Continuous>  dextrose 5%. 1000 milliLiter(s) (100 mL/Hr) IV Continuous <Continuous>  dextrose 50% Injectable 25 Gram(s) IV Push once  dextrose 50% Injectable 12.5 Gram(s) IV Push once  dextrose 50% Injectable 25 Gram(s) IV Push once  famotidine    Tablet 20 milliGRAM(s) Oral daily  glucagon  Injectable 1 milliGRAM(s) IntraMuscular once  heparin   Injectable 5000 Unit(s) SubCutaneous every 12 hours  insulin glargine Injectable (LANTUS) 10 Unit(s) SubCutaneous at bedtime  insulin lispro (ADMELOG) corrective regimen sliding scale   SubCutaneous three times a day before meals  insulin lispro (ADMELOG) corrective regimen sliding scale   SubCutaneous at bedtime  insulin lispro Injectable (ADMELOG) 3 Unit(s) SubCutaneous three times a day before meals    MEDICATIONS  (PRN):      REVIEW OF SYSTEMS:  General: no distress  Respiratory: No cough, SOB  Cardiovascular: No CP or Palpitations	  Gastrointestinal: No nausea, Vomiting. No diarrhea  Genitourinary: No urinary complaints	  Musculoskeletal: No new rash or lesions		  all other systems negative    T(F): 98.4 (10-21-21 @ 11:33), Max: 98.6 (10-20-21 @ 21:47)  HR: 100 (10-21-21 @ 11:33) (89 - 106)  BP: 160/97 (10-21-21 @ 11:33) (145/84 - 197/95)  RR: 17 (10-21-21 @ 11:33) (16 - 18)  SpO2: 97% (10-21-21 @ 11:33) (94% - 100%)  Wt(kg): --    PHYSICAL EXAM:  General: NAD  Respiratory: b/l air entry  Cardiovascular: S1 S2  Gastrointestinal: soft  Extremities: no edema        10-21    138  |  103  |  21  ----------------------------<  368<H>  4.0   |  29  |  1.70<H>    Ca    9.2      21 Oct 2021 07:46    TPro  7.7  /  Alb  2.8<L>  /  TBili  0.5  /  DBili  x   /  AST  11<L>  /  ALT  15  /  AlkPhos  119  10-21                          12.4   5.62  )-----------( 326      ( 21 Oct 2021 07:46 )             35.0       Hemoglobin: 12.4 g/dL (10-21 @ 07:46)  Hematocrit: 35.0 % (10-21 @ 07:46)  Potassium, Serum: 4.0 mmol/L (10-21 @ 07:46)  Blood Urea Nitrogen, Serum: 21 mg/dL (10-21 @ 07:46)      Creatinine, Serum: 1.70 (10-21 @ 07:46)  Creatinine, Serum: 1.80 (10-20 @ 18:56)        LIVER FUNCTIONS - ( 21 Oct 2021 07:46 )  Alb: 2.8 g/dL / Pro: 7.7 g/dL / ALK PHOS: 119 U/L / ALT: 15 U/L / AST: 11 U/L / GGT: x           CARDIAC MARKERS ( 20 Oct 2021 18:56 )  <.015 ng/mL / x     / x     / x     / <1.0 ng/mL      < from: MR Angio Head No Cont (10.21.21 @ 09:17) >    EXAM:  MR ANGIO BRAIN                            PROCEDURE DATE:  10/21/2021          INTERPRETATION:  INDICATION:  Left thalamic infarct    TECHNIQUE:  MR angiography of the brain was performed using three dimensional time-of-flight (3D-TOF) technique.  Imaging was performed on a 1.5 Shabnam magnet.    FINDINGS:    INTERNAL CAROTID ARTERIES:  Bilaterally patent.  No intraluminal filling defect or dissection seen.    [place]:  No aneurysm identified.    CEREBRAL ARTERIES:  Both anterior cerebral arteries are patent. Both A1 segments are well formed. Both middle cerebral arteries are patent. No M1 lesion is noted.    VERTEBROBASILAR SYSTEM:  Both vertebral arteries are patent. Basilar artery is widely patent. Posterior cerebral arteries are unremarkable.    IMPRESSION:    1. unremarkable MR angiographic study of the brain. Widely patent vasculature. No changes of underlying vasculopathy or stenosis.    --- End of Report ---            MICK MIGUEL MD; Attending Radiologist  This document has been electronically signed. Oct 21 2021  9:53AM    < end of copied text >          < from: CT Head No Cont (10.20.21 @ 19:10) >    EXAM:  CT BRAIN                            PROCEDURE DATE:  10/20/2021          INTERPRETATION:  INDICATION:  Right-sided numbness.  TECHNIQUE:  A non contrast 2.5 or 3 mm axial CT study of the brain was performed from skull base to vertex. [place] sagittal reformations were generated from the axial data.  COMPARISON EXAMINATION:  No prior    FINDINGS:    HEMISPHERES:  There are lacunar infarcts in both thalami, of indeterminate age. The a left thalamic infarct could be recent and/or subacute. This may be further assessed with MR imaging. Otherwise the brain is unremarkable in appearance with no territorial infarct or hemorrhage.  VENTRICLES:  Midline and normal in size.  POSTERIOR FOSSA:  The brain stem and cerebellum are unremarkable. No CP angle lesion noted.  EXTRACEREBRAL SPACES:  No subdural or epidural collections are noted.  SKULL BASE AND CALVARIUM:  Appears intact.  No fracture or destructive lesion is identified.  SINUSES AND MASTOIDS:  Clear.  MISCELLANEOUS:  No orbitalor suprasellar abnormality noted.    IMPRESSION:    1)  there are lacunar infarcts in both thalami. A left thalamic lacune may be recent and/or subacute. This may be further assessed with MR imaging. The brain is otherwise unremarkable.  2)  the right sphenoid sinus is moderately opacified. Maxillary sinuses are incompletely imaged but there is thickening on the right.    Dr. Anderson notified at 7:30 PM.    --- End of Report ---            MICK MIGUEL MD; Attending Radiologist  This document has been electronically signed. Oct 20 2021  7:34PM    < end of copied text >

## 2021-10-21 NOTE — CONSULT NOTE ADULT - SUBJECTIVE AND OBJECTIVE BOX
Patient is a 51y old  Male who presents with a chief complaint of     Reason For Consult:     HPI:  Patient is a 52 y/o M with PMH of HTN, DM, HLD, Bell's palsy(2017), presented to ED with right side numbness. Reports he has been feeling intermittent numbness since past 1 month, then yesterday he   woke up with right sided numbness yesterday, which was constant from head to toe, denies associated weakness, slurred speech difficulty swallowing, blurry vision, chest pain or trouble with gait.  Patient  states he thought it would go away but didn't get better. Reports he is not seen by any physician since 2019, at that time he was put on medication metformin and rest of the medication he didn't remember. He ran out of medication in April 2020 and has no medical insurance coverage since then. States numbness is still the same after getting hydralazine and Norvasc in ED. Patient is unvaccinated for COVID. Denies sick contacts or recent travel.     Of note, patient is reluctant on answering few questions. He might be with holding some information regarding his PMH. Patient admits his diet is mostly junk food, he adds raw salt/seasoning/ brooks on top of the meals. 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 Per patient, he was laid off from work years ago, lives alone. Doesn't follow any PCP. Patient is not taking any medications at home.    Vitals: T 98.2F, HR 97, /99, RR 16, SpO2 98  Significant labs: Cr 1.8, Glucose 286, H/H 11.7/34.3  Patient has been given aspirin [id number] , Hydralazine 10mg IVPx2 , Norvasc 5mgx1, atorvastatin 80 mg x1, famotidine 20mg x1  CT head noncontrast: 1)  there are lacunar infarcts in both thalami. A left thalamic lacune may be recent and/or subacute. This may be further assessed with MR imaging. The brain is otherwise unremarkable. 2)  the right sphenoid sinus is moderately opacified. Maxillary sinuses are incompletely imaged but there is thickening on the right.  EKG: sinus rhythm with premature atrial complexes.  (20 Oct 2021 20:48)      PAST MEDICAL & SURGICAL HISTORY:  Primary hypertension    H/O Bell&#x27;s palsy    Hyperlipemia    Type 2 diabetes mellitus        FAMILY HISTORY:        Social History:    MEDICATIONS  (STANDING):  amLODIPine   Tablet 5 milliGRAM(s) Oral daily  atorvastatin 80 milliGRAM(s) Oral at bedtime  dextrose 40% Gel 15 Gram(s) Oral once  dextrose 40% Gel 15 Gram(s) Oral once  dextrose 5%. 1000 milliLiter(s) (50 mL/Hr) IV Continuous <Continuous>  dextrose 5%. 1000 milliLiter(s) (50 mL/Hr) IV Continuous <Continuous>  dextrose 5%. 1000 milliLiter(s) (100 mL/Hr) IV Continuous <Continuous>  dextrose 50% Injectable 25 Gram(s) IV Push once  dextrose 50% Injectable 12.5 Gram(s) IV Push once  dextrose 50% Injectable 25 Gram(s) IV Push once  famotidine    Tablet 20 milliGRAM(s) Oral daily  glucagon  Injectable 1 milliGRAM(s) IntraMuscular once  heparin   Injectable 5000 Unit(s) SubCutaneous every 12 hours  insulin glargine Injectable (LANTUS) 10 Unit(s) SubCutaneous at bedtime  insulin lispro (ADMELOG) corrective regimen sliding scale   SubCutaneous three times a day before meals  insulin lispro (ADMELOG) corrective regimen sliding scale   SubCutaneous at bedtime  insulin lispro Injectable (ADMELOG) 3 Unit(s) SubCutaneous three times a day before meals    MEDICATIONS  (PRN):        T(C): 36.9 (10-21-21 @ 11:33), Max: 37 (10-20-21 @ 21:47)  HR: 100 (10-21-21 @ 11:33) (89 - 106)  BP: 160/97 (10-21-21 @ 11:33) (145/84 - 197/95)  RR: 17 (10-21-21 @ 11:33) (16 - 18)  SpO2: 97% (10-21-21 @ 11:33) (94% - 100%)  Wt(kg): --    PHYSICAL EXAM:  GENERAL: NAD, well-groomed, well-developed  HEAD:  Atraumatic, Normocephalic  NECK: Supple, No JVD, Normal thyroid  CHEST/LUNG: Clear to percussion bilaterally; No rales, rhonchi, wheezing, or rubs  HEART: Regular rate and rhythm; No murmurs, rubs, or gallops  ABDOMEN: Soft, Nontender, Nondistended; Bowel sounds present  EXTREMITIES:  2+ Peripheral Pulses, No clubbing, cyanosis, or edema  SKIN: No rashes or lesions    CAPILLARY BLOOD GLUCOSE      POCT Blood Glucose.: 244 mg/dL (21 Oct 2021 11:29)  POCT Blood Glucose.: 371 mg/dL (21 Oct 2021 07:29)  POCT Blood Glucose.: 254 mg/dL (20 Oct 2021 21:44)                            12.4   5.62  )-----------( 326      ( 21 Oct 2021 07:46 )             35.0       CMP:  10-21 @ 07:46  SGPT 15  Albumin 2.8   Alk Phos 119   Anion Gap 6   SGOT 11   Total Bili 0.5   BUN 21   Calcium Total 9.2   CO2 29   Chloride 103   Creatinine 1.70   eGFR if AA 53   eGFR if non AA 46   Glucose 368   Potassium 4.0   Protein 7.7   Sodium 138      Thyroid Function Tests:      Diabetes Tests:       Radiology:

## 2021-10-21 NOTE — CONSULT NOTE ADULT - ASSESSMENT
Patient is a 52 y/o M with PMH of HTN, DM, HLD, Bell's palsy(2017), presented to ED with right side numbness. Patient admitted with CVA and HTN urgency. Cardiology consulted.    CVA  - Likely 2/2 uncontrolled HTN, diabetes and increased stress. Patient states he hasn't taken his meds in 1 year due to insurance issues. Confirmed on CT head.   - EKG NSR with nonspecific ST wave abnormalities, no previous EKG on EMR  - S/p aspirin 325mg, continue daily  - Continue high dose statin with pepcid  - F/u TTE with carotid dopplers and MRI head    HTN  - SBP now improving 145, however found to be 194 on admission   - Patient is not complaining of any cardiac symptoms at this time  - Allow permissive HTN for first 48 hours  - Previously on BP med however doesn't recall the name   - Continue norvasc 5mg po qd  - DASH diet  - Monitor routine hemodynamics     Type 2 DM  - Previously on metformin however hasn't taken meds in 1 year  - Consistent carb diet   - Continue low dose correctional insulin scale  however likely to need additional insulin given uncontrolled diabetes for extended period of time and elevated BG levels this morning  - F/u HgbA1c  - Goal blood glucose in hospital setting 100-180, adjust insulin regimen PRN, can consider endo consult in uncontrolled   - Hypoglycemia protocol in place, monitor for signs of hypoglycemia         - No clear evidence of acute ischemia, trops negative x 2. Will follow up third set.    - No acute changes on EKG compared to previous.  - No meaningful evidence of volume overload.  - Previous TTE shows ___.  - BP well controlled, monitor routine hemodynamics.    - Monitor and replete lytes, keep K>4, Mg>2.  - Strict I/Os, daily weights.    - His CKs are flat, suggesting against acute atherosclerotic plaque rupture.  - Biomarker trend is not consistent with plaque rupture but rather demand ischemia. Monitor closely for the development of anginal symptoms or clinical signs of ischemia.     - Other cardiovascular workup will depend on clinical course.  - All other workup per primary team.  - Will continue to follow.               Patient is a 52 y/o M with PMH of HTN, DM, HLD, Bell's palsy(2017), presented to ED with right side numbness. Patient admitted with CVA and HTN urgency. Cardiology consulted.    *****CHARTING IN PROGRESS*******     CVA  - Likely 2/2 uncontrolled HTN, diabetes and increased stress. Patient states he hasn't taken his meds in 1 year due to insurance issues. Confirmed on CT head.   - EKG NSR with nonspecific ST wave abnormalities, no previous EKG on EMR  - S/p aspirin 325mg, continue daily  - Continue high dose statin with pepcid  - F/u TTE with carotid dopplers and MRI head    HTN  - SBP now improving 145, however found to be 194 on admission   - Patient is not complaining of any cardiac symptoms at this time  - Allow permissive HTN for first 48 hours  - Previously on BP med however doesn't recall the name   - Continue norvasc 5mg po qd  - DASH diet  - Monitor routine hemodynamics     Type 2 DM  - Previously on metformin however hasn't taken meds in 1 year  - Consistent carb diet   - Continue low dose correctional insulin scale  however likely to need additional insulin given uncontrolled diabetes for extended period of time and elevated BG levels this morning  - F/u HgbA1c  - Goal blood glucose in hospital setting 100-180, adjust insulin regimen PRN, can consider endo consult in uncontrolled   - Hypoglycemia protocol in place, monitor for signs of hypoglycemia       - Monitor and replete lytes, keep K>4, Mg>2.  - Other cardiovascular workup will depend on clinical course.  - All other workup per primary team.  - Will continue to follow.               Patient is a 52 y/o M with PMH of HTN, DM, HLD, Bell's palsy(2017), presented to ED with right side numbness. Patient admitted with CVA and HTN urgency. Cardiology consulted.    CVA  - Likely 2/2 uncontrolled HTN. Patient states he hasn't taken his meds in 1 year due to insurance issues. Confirmed on CT head.   - EKG NSR with nonspecific ST wave abnormalities, no previous EKG on EMR  - S/p aspirin 325mg, continue daily  - Continue high dose statin    - F/u TTE    - carotid dopplers and MRI head    HTN  - SBP now improving 145, however found to be 194 on admission   - Allow permissive HTN for first 48 hours  - Previously on BP med however doesn't recall the name   - Continue norvasc 5mg po qd titrate up when ok with neuro  - DASH diet  - Monitor routine hemodynamics     Type 2 DM  - Previously on metformin however hasn't taken meds in 1 year  - Consistent carb diet   - Continue low dose correctional insulin scale  however likely to need additional insulin given uncontrolled diabetes for extended period of time and elevated BG levels this morning  - F/u HgbA1c  - Goal blood glucose in hospital setting 100-180, adjust insulin regimen PRN, can consider endo consult in uncontrolled   - Hypoglycemia protocol in place, monitor for signs of hypoglycemia   - elevated CR. Please continue to maintain strict I/Os, monitor daily weights, Cr, and K.     - Monitor and replete lytes, keep K>4, Mg>2.  - Other cardiovascular workup will depend on clinical course.  - All other workup per primary team.  - Will continue to follow.

## 2021-10-21 NOTE — OCCUPATIONAL THERAPY INITIAL EVALUATION ADULT - PERTINENT HX OF CURRENT PROBLEM, REHAB EVAL
52 y/o male presented to ED with c/o numbness to right side of body and admitted with cerebral infarction. CT Brain: +lacunar infarcts. MR brain 10/20 +small acute left thalamic lacunar infarct. Multiple white matter lesions are noted in both hemispheres, many of which are periventricular. A differential includes underlying chronic ischemic changes, although demyelinating disease and/or an inflammatory etiology cannot be totally excluded.

## 2021-10-21 NOTE — DIETITIAN INITIAL EVALUATION ADULT. - ORAL INTAKE PTA/DIET HISTORY
PTA per pt  -Intake: good PO intake; reports consuming  a lot of salad, tries to avoid carbohydrates; confirms drinking a lot of soda and juice   -Chewing/Swallowing: denies difficulty   -Allergies/Intolerances: shellfish - anaphylaxis   -Vitamins/Supplements: denies use

## 2021-10-21 NOTE — DISCHARGE NOTE PROVIDER - CARE PROVIDER_API CALL
Ayush Johnson  NEUROLOGY  [address] [place] [address]  Phone: [phone]  Fax: [phone]  Follow Up Time: 1 week    Mj Connor)  Internal Medicine  [address], [place] [id number]  Phone: [phone]  Fax: [phone]  Follow Up Time: 1 week   Ayush Johnson  NEUROLOGY  [address] [place] [address]  Phone: [phone]  Fax: [phone]  Follow Up Time: 1 week    Mj Connor)  Internal Medicine  [address] [place], [place] [id number]  Phone: [phone]  Fax: [phone]  Follow Up Time: 1 week    Perlman, Craig   MEDICINE  [address], [address]  [place] [address]  Phone: [phone]  Fax: [phone]  Follow Up Time:     Sage Isabel  [facility]  [address], [address]  [place] [address]  Phone: [phone]  Fax: [phone]  Follow Up Time:     Grover Grissom (DO)  Family Medicine  [address], [address]  [place] [address]  Phone: [phone]  Fax: [phone]  Follow Up Time:

## 2021-10-21 NOTE — PROGRESS NOTE ADULT - SUBJECTIVE AND OBJECTIVE BOX
Patient is a 51y old  Male who presents with a chief complaint of   HPI:  Patient is a 52 y/o M with PMH of HTN, DM, HLD, Bell's palsy(2017), presented to ED with right side numbness. Reports he has been feeling intermittent numbness since past 1 month, then yesterday he   woke up with right sided numbness yesterday, which was constant from head to toe, denies associated weakness, slurred speech difficulty swallowing, blurry vision, chest pain or trouble with gait.  Patient  states he thought it would go away but didn't get better. Reports he is not seen by any physician since 2019, at that time he was put on medication metformin and rest of the medication he didn't remember. He ran out of medication in April 2020 and has no medical insurance coverage since then. States numbness is still the same after getting hydralazine and Norvasc in ED. Patient is unvaccinated for COVID. Denies sick contacts or recent travel.     Of note, patient is reluctant on answering few questions. He might be with holding some information regarding his PMH. Patient admits his diet is mostly junk food, he adds raw salt/seasoning/ brooks on top of the meals. 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 Per patient, he was laid off from work years ago, lives alone. Doesn't follow any PCP. Patient is not taking any medications at home.    Vitals: T 98.2F, HR 97, /99, RR 16, SpO2 98  Significant labs: Cr 1.8, Glucose 286, H/H 11.7/34.3  Patient has been given aspirin [id number] , Hydralazine 10mg IVPx2 , Norvasc 5mgx1, atorvastatin 80 mg x1, famotidine 20mg x1  CT head noncontrast: 1)  there are lacunar infarcts in both thalami. A left thalamic lacune may be recent and/or subacute. This may be further assessed with MR imaging. The brain is otherwise unremarkable. 2)  the right sphenoid sinus is moderately opacified. Maxillary sinuses are incompletely imaged but there is thickening on the right.  EKG: sinus rhythm with premature atrial complexes.  (20 Oct 2021 20:48)    INTERVAL HPI:  10/21/21: Patient was seen and examined at bedside.     OVERNIGHT EVENTS: No acute overnight events.     Home Medications:      MEDICATIONS  (STANDING):  amLODIPine   Tablet 5 milliGRAM(s) Oral daily  atorvastatin 80 milliGRAM(s) Oral at bedtime  dextrose 40% Gel 15 Gram(s) Oral once  dextrose 40% Gel 15 Gram(s) Oral once  dextrose 5%. 1000 milliLiter(s) (50 mL/Hr) IV Continuous <Continuous>  dextrose 5%. 1000 milliLiter(s) (50 mL/Hr) IV Continuous <Continuous>  dextrose 5%. 1000 milliLiter(s) (100 mL/Hr) IV Continuous <Continuous>  dextrose 50% Injectable 25 Gram(s) IV Push once  dextrose 50% Injectable 12.5 Gram(s) IV Push once  dextrose 50% Injectable 25 Gram(s) IV Push once  famotidine    Tablet 20 milliGRAM(s) Oral daily  glucagon  Injectable 1 milliGRAM(s) IntraMuscular once  heparin   Injectable 5000 Unit(s) SubCutaneous every 12 hours  insulin glargine Injectable (LANTUS) 10 Unit(s) SubCutaneous at bedtime  insulin lispro (ADMELOG) corrective regimen sliding scale   SubCutaneous three times a day before meals  insulin lispro (ADMELOG) corrective regimen sliding scale   SubCutaneous at bedtime  insulin lispro Injectable (ADMELOG) 3 Unit(s) SubCutaneous three times a day before meals    MEDICATIONS  (PRN):      Allergies    No Known Drug Allergies  shellfish (Short breath)    Intolerances        Social History:  Tobacco:  denies  EtOH:  denies  Recreational drug use: denies  Lives with: alone   Ambulates: independently   Vaccinations:  Unvaccinated for COVID (20 Oct 2021 20:48)      REVIEW OF SYSTEMS:  CONSTITUTIONAL: No fever, No chills, No fatigue, No myalgia, No Body ache, No Weakness  EYES: No eye pain,  No visual disturbances, No discharge, NO Redness  ENMT:  No ear pain, No nose bleed, No vertigo; No sinus pain, NO throat pain, No Congestion  NECK: No pain, No stiffness  RESPIRATORY: No cough, NO wheezing, No  hemoptysis, NO  shortness of breath  CARDIOVASCULAR: No chest pain, palpitations  GASTROINTESTINAL: No abdominal pain, NO epigastric pain. No nausea, No vomiting; No diarrhea, No constipation. [  ] BM  GENITOURINARY: No dysuria, No frequency, No urgency, No hematuria, NO incontinence  NEUROLOGICAL: No headaches, No dizziness, No numbness, No tingling, No tremors, No weakness  EXT: No Swelling, No Pain, No Edema  SKIN:  [  ] No itching, burning, rashes, or lesions   MUSCULOSKELETAL: No joint pain ,No Jt swelling; No muscle pain, No back pain, No extremity pain  PSYCHIATRIC: No depression,  No anxiety,  No mood swings ,No difficulty sleeping at night  PAIN SCALE: [  ] None  [  ] Other-  ROS Unable to obtain due to - [  ] Dementia  [  ] Lethargy [  ] Drowsy [  ] Sedated [  ] non verbal  REST OF REVIEW Of SYSTEM - [  ] Normal     Vital Signs Last 24 Hrs  T(C): 36.8 (21 Oct 2021 07:53), Max: 37 (20 Oct 2021 21:47)  T(F): 98.2 (21 Oct 2021 07:53), Max: 98.6 (20 Oct 2021 21:47)  HR: 105 (21 Oct 2021 07:53) (89 - 105)  BP: 160/97 (21 Oct 2021 07:53) (145/84 - 197/95)  BP(mean): --  RR: 18 (21 Oct 2021 04:10) (16 - 18)  SpO2: 94% (21 Oct 2021 04:10) (94% - 100%)  Finger Stick          PHYSICAL EXAM:  GENERAL:  [  ] NAD , [  ] well appearing, [  ] Agitated, [  ] Drowsy,  [  ] Lethargy, [  ] confused   HEAD:  [  ] Normal, [  ] Other  EYES:  [  ] EOMI, [  ] PERRLA, [  ] conjunctiva and sclera clear normal, [  ] Other,  [  ] Pallor,[  ] Discharge  ENMT:  [  ] Normal, [  ] Moist mucous membranes, [  ] Good dentition, [  ] No Thrush  NECK:  [  ] Supple, [  ] No JVD, [  ] Normal thyroid, [  ] Lymphadenopathy [  ] Other  CHEST/LUNG:  [  ] Clear to auscultation bilaterally, [  ] Breath Sounds equal B/L / Decrease, [  ] poor effort  [  ] No rales, [  ] No rhonchi  [  ]  No wheezing,   HEART:  [  ] Regular rate and rhythm, [  ] tachycardia, [  ] Bradycardia,  [  ] irregular  [  ] No murmurs, No rubs, No gallops, [  ] PPM in place (Mfr:  )  ABDOMEN:  [  ] Soft, [  ] Nontender, [  ] Nondistended, [  ] No mass, [  ] Bowel sounds present, [  ] obese  NERVOUS SYSTEM:  [  ] Alert & Oriented X3, [  ] Nonfocal  [  ] Confusion  [  ] Encephalopathic [  ] Sedated [  ] Unable to assess, [  ] Dementia [  ] Other-  EXTREMITIES: [  ] 2+ Peripheral Pulses, No clubbing, No cyanosis,  [  ] edema B/L lower EXT. [  ] PVD stasis skin changes B/L Lower EXT, [  ] wound  LYMPH: No lymphadenopathy noted  SKIN:  [  ] No rashes or lesions, [  ] Pressure Ulcers, [  ] ecchymosis, [  ] Skin Tears, [  ] Other    DIET: Diet, Consistent Carbohydrate w/Evening Snack:   No Beef  No Pork (10-21-21 @ 10:18)      LABS:                        12.4   5.62  )-----------( 326      ( 21 Oct 2021 07:46 )             35.0     21 Oct 2021 07:46    138    |  103    |  21     ----------------------------<  368    4.0     |  29     |  1.70     Ca    9.2        21 Oct 2021 07:46    TPro  7.7    /  Alb  2.8    /  TBili  0.5    /  DBili  x      /  AST  11     /  ALT  15     /  AlkPhos  119    21 Oct 2021 07:46    PT/INR - ( 20 Oct 2021 18:56 )   PT: 11.9 sec;   INR: 1.02 ratio         PTT - ( 20 Oct 2021 18:56 )  PTT:28.6 sec              CARDIAC MARKERS ( 20 Oct 2021 18:56 )  <.015 ng/mL / x     / x     / x     / <1.0 ng/mL             Anemia Panel:      Thyroid Panel:                RADIOLOGY & ADDITIONAL TESTS:      HEALTH ISSUES - PROBLEM Dx:  Stroke    BRANDO (acute kidney injury)    DVT prophylaxis    Type 2 diabetes mellitus    Hypertensive urgency            Consultant(s) Notes Reviewed:  [ x ] YES     Care Discussed with [X] Consultants  [ x ] Patient  [  ] Family [  ] HCP [  ]   [  ] Social Service  [  ] RN, [  ] Physical Therapy,[  ] Palliative care team  DVT PPX: [  ] Lovenox, [ x ] S C Heparin, [  ] Coumadin, [  ] Xarelto, [  ] Eliquis, [  ] Pradaxa, [  ] IV Heparin drip, [  ] SCD [  ] Contraindication 2 to GI Bleed,[  ] Ambulation [  ] Contraindicated 2 to  bleed [  ] Contraindicated 2 to Brain Bleed  Advanced directive: [ x ] None, [  ] DNR/DNI Patient is a 51y old  Male who presents with a chief complaint of   HPI:  Patient is a 52 y/o M with PMH of HTN, DM, HLD, Bell's palsy(2017), presented to ED with right side numbness. Reports he has been feeling intermittent numbness since past 1 month, then yesterday he   woke up with right sided numbness yesterday, which was constant from head to toe, denies associated weakness, slurred speech difficulty swallowing, blurry vision, chest pain or trouble with gait.  Patient  states he thought it would go away but didn't get better. Reports he is not seen by any physician since 2019, at that time he was put on medication metformin and rest of the medication he didn't remember. He ran out of medication in April 2020 and has no medical insurance coverage since then. States numbness is still the same after getting hydralazine and Norvasc in ED. Patient is unvaccinated for COVID. Denies sick contacts or recent travel.     Of note, patient is reluctant on answering few questions. He might be with holding some information regarding his PMH. Patient admits his diet is mostly junk food, he adds raw salt/seasoning/ brooks on top of the meals. 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 Per patient, he was laid off from work years ago, lives alone. Doesn't follow any PCP. Patient is not taking any medications at home.    Vitals: T 98.2F, HR 97, /99, RR 16, SpO2 98  Significant labs: Cr 1.8, Glucose 286, H/H 11.7/34.3  Patient has been given aspirin [id number] , Hydralazine 10mg IVPx2 , Norvasc 5mgx1, atorvastatin 80 mg x1, famotidine 20mg x1  CT head noncontrast: 1)  there are lacunar infarcts in both thalami. A left thalamic lacune may be recent and/or subacute. This may be further assessed with MR imaging. The brain is otherwise unremarkable. 2)  the right sphenoid sinus is moderately opacified. Maxillary sinuses are incompletely imaged but there is thickening on the right.  EKG: sinus rhythm with premature atrial complexes.  (20 Oct 2021 20:48)    INTERVAL HPI:  10/21/21: Patient was seen and examined at bedside. MRI Brain done , ECHO, Carotid Doppler done,  feel better.    OVERNIGHT EVENTS: No acute overnight events.     Home Medications:      MEDICATIONS  (STANDING):  amLODIPine   Tablet 5 milliGRAM(s) Oral daily  atorvastatin 80 milliGRAM(s) Oral at bedtime  dextrose 40% Gel 15 Gram(s) Oral once  dextrose 40% Gel 15 Gram(s) Oral once  dextrose 5%. 1000 milliLiter(s) (50 mL/Hr) IV Continuous <Continuous>  dextrose 5%. 1000 milliLiter(s) (50 mL/Hr) IV Continuous <Continuous>  dextrose 5%. 1000 milliLiter(s) (100 mL/Hr) IV Continuous <Continuous>  dextrose 50% Injectable 25 Gram(s) IV Push once  dextrose 50% Injectable 12.5 Gram(s) IV Push once  dextrose 50% Injectable 25 Gram(s) IV Push once  famotidine    Tablet 20 milliGRAM(s) Oral daily  glucagon  Injectable 1 milliGRAM(s) IntraMuscular once  heparin   Injectable 5000 Unit(s) SubCutaneous every 12 hours  insulin glargine Injectable (LANTUS) 10 Unit(s) SubCutaneous at bedtime  insulin lispro (ADMELOG) corrective regimen sliding scale   SubCutaneous three times a day before meals  insulin lispro (ADMELOG) corrective regimen sliding scale   SubCutaneous at bedtime  insulin lispro Injectable (ADMELOG) 3 Unit(s) SubCutaneous three times a day before meals    MEDICATIONS  (PRN):      Allergies    No Known Drug Allergies  shellfish (Short breath)    Intolerances        Social History:  Tobacco:  denies  EtOH:  denies  Recreational drug use: denies  Lives with: alone   Ambulates: independently   Vaccinations:  Unvaccinated for COVID (20 Oct 2021 20:48)      REVIEW OF SYSTEMS: i am OK  CONSTITUTIONAL: No fever, No chills, No fatigue, No myalgia, No Body ache, No Weakness  EYES: No eye pain,  No visual disturbances, No discharge, NO Redness  ENMT:  No ear pain, No nose bleed, No vertigo; No sinus pain, NO throat pain, No Congestion  NECK: No pain, No stiffness  RESPIRATORY: No cough, NO wheezing, No  hemoptysis, NO  shortness of breath  CARDIOVASCULAR: No chest pain, palpitations  GASTROINTESTINAL: No abdominal pain, NO epigastric pain. No nausea, No vomiting; No diarrhea, No constipation. [  ] BM  GENITOURINARY: No dysuria, No frequency, No urgency, No hematuria, NO incontinence  NEUROLOGICAL: No headaches, No dizziness, No numbness, No tingling, No tremors, No weakness  EXT: No Swelling, No Pain, No Edema  SKIN:  [ x ] No itching, burning, rashes, or lesions   MUSCULOSKELETAL: No joint pain ,No Jt swelling; No muscle pain, No back pain, No extremity pain  PSYCHIATRIC: No depression,  No anxiety,  No mood swings ,No difficulty sleeping at night  PAIN SCALE: [ x ] None  [  ] Other-  ROS Unable to obtain due to - [  ] Dementia  [  ] Lethargy [  ] Drowsy [  ] Sedated [  ] non verbal  REST OF REVIEW Of SYSTEM - [ x ] Normal     Vital Signs Last 24 Hrs  T(C): 36.8 (21 Oct 2021 07:53), Max: 37 (20 Oct 2021 21:47)  T(F): 98.2 (21 Oct 2021 07:53), Max: 98.6 (20 Oct 2021 21:47)  HR: 105 (21 Oct 2021 07:53) (89 - 105)  BP: 160/97 (21 Oct 2021 07:53) (145/84 - 197/95)  BP(mean): --  RR: 18 (21 Oct 2021 04:10) (16 - 18)  SpO2: 94% (21 Oct 2021 04:10) (94% - 100%)  Finger Stick          PHYSICAL EXAM:  GENERAL:  [x  ] NAD , [ x ] well appearing, [  ] Agitated, [  ] Drowsy,  [  ] Lethargy, [  ] confused   HEAD:  [x  ] Normal, [  ] Other  EYES:  [ x ] EOMI, [ x ] PERRLA, [ x ] conjunctiva and sclera clear normal, [  ] Other,  [  ] Pallor,[  ] Discharge  ENMT:  [ x ] Normal, [ x ] Moist mucous membranes, [x  ] Good dentition, [ x ] No Thrush  NECK:  [ x ] Supple, [x  ] No JVD, [ x ] Normal thyroid, [  ] Lymphadenopathy [  ] Other  CHEST/LUNG:  [x  ] Clear to auscultation bilaterally, [x  ] Breath Sounds equal B/L / Decrease, [x  ] poor effort  [ x ] No rales, [x  ] No rhonchi  [ x ]  No wheezing,   HEART:  [ x ] Regular rate and rhythm, [  ] tachycardia, [  ] Bradycardia,  [  ] irregular  [  ] No murmurs, No rubs, No gallops, [  ] PPM in place (Mfr:  )  ABDOMEN:  [x  ] Soft, [ x ] Nontender, [ x ] Nondistended, [ x ] No mass, [ x ] Bowel sounds present, [x  ] obese  NERVOUS SYSTEM:  [  ] Alert & Oriented X3, [ x ] Nonfocal  [  ] Confusion  [  ] Encephalopathic [  ] Sedated [  ] Unable to assess, [  ] Dementia [  ] Other-  EXTREMITIES: [x  ] 2+ Peripheral Pulses, No clubbing, No cyanosis,  [  ] edema B/L lower EXT. [  ] PVD stasis skin changes B/L Lower EXT, [  ] wound  LYMPH: No lymphadenopathy noted  SKIN:  [ x ] No rashes or lesions, [  ] Pressure Ulcers, [  ] ecchymosis, [  ] Skin Tears, [  ] Other    DIET: Diet, Consistent Carbohydrate w/Evening Snack:   No Beef  No Pork (10-21-21 @ 10:18)      LABS:                        12.4   5.62  )-----------( 326      ( 21 Oct 2021 07:46 )             35.0     21 Oct 2021 07:46    138    |  103    |  21     ----------------------------<  368    4.0     |  29     |  1.70     Ca    9.2        21 Oct 2021 07:46    TPro  7.7    /  Alb  2.8    /  TBili  0.5    /  DBili  x      /  AST  11     /  ALT  15     /  AlkPhos  119    21 Oct 2021 07:46    PT/INR - ( 20 Oct 2021 18:56 )   PT: 11.9 sec;   INR: 1.02 ratio         PTT - ( 20 Oct 2021 18:56 )  PTT:28.6 sec    CARDIAC MARKERS ( 20 Oct 2021 18:56 )  <.015 ng/mL / x     / x     / x     / <1.0 ng/mL      RADIOLOGY & ADDITIONAL TESTS:  < from: US Kidney and Bladder (10.21.21 @ 16:31) >      PROCEDURE DATE:  10/21/2021          INTERPRETATION:  CLINICAL INFORMATION: Acute kidney injury. Evaluate for urinary retention.    COMPARISON: None available.    TECHNIQUE: Sonography of thekidneys and bladder.    FINDINGS:    Right kidney: 11.2 cm. No renal mass, hydronephrosis or calculi.    Left kidney: 9.6 cm. No renal mass, hydronephrosis or calculi.    Urinary bladder: The bladder is distended at the time of examination (calculated volume = 853 mL). The patient reports he does not have the urge to void at the time of exam.    IMPRESSION:    Sonographically normal kidneys. No hydronephrosis.    Large bladder volume of 853 mL. The patient declined to void at the time of exam.      < end of copied text >  < from: MR Angio Head No Cont (10.21.21 @ 09:17) >      PROCEDURE DATE:  10/21/2021          INTERPRETATION:  INDICATION:  Left thalamic infarct    TECHNIQUE:  MR angiography of the brain was performed using three dimensional time-of-flight (3D-TOF) technique.  Imaging was performed on a 1.5 Shabnam magnet.    FINDINGS:    INTERNAL CAROTID ARTERIES:  Bilaterally patent.  No intraluminal filling defect or dissection seen.    [place] OF [place]:  No aneurysm identified.    CEREBRAL ARTERIES:  Both anterior cerebral arteries are patent. Both A1 segments are well formed. Both middle cerebral arteries are patent. No M1 lesion is noted.    VERTEBROBASILAR SYSTEM:  Both vertebral arteries are patent. Basilar artery is widely patent. Posterior cerebral arteries are unremarkable.    IMPRESSION:    1. unremarkable MR angiographic study of the brain. Widely patent vasculature. No changes of underlying vasculopathy or stenosis.    --- End of Report ---      < end of copied text >  < from: MR Head No Cont (10.21.21 @ 09:16) >  PROCEDURE DATE:  10/21/2021          INTERPRETATION:  INDICATION:    Stroke  TECHNIQUE:  Multiplanar brain imaging was conducted. T1, T2 and FLAIR imaging was performed.  In addition, diffusion imaging, diffusion coefficient assessment (ADC) and T2* was incorporated . No contrast administered.  COMPARISON EXAMINATION:  CT 10/20/2021    FINDINGS:    HEMISPHERES: There is diffusion restriction in the left thalamic region corresponding to the area of hypodensity on CT. The appearance is consistent with a small acute left thalamic lacunar infarct. Multiple white matter lesions are noted in both hemispheres, many of which are periventricular. A differential includes underlying chronic ischemic changes, although demyelinating disease and/or an inflammatory etiology cannot be totally excluded.    VENTRICLES:  midline and normal in size  POSTERIOR FOSSA:  The brain stem and cerebellum are unremarkable in appearance.  No CP angle abnormality is noted.  EXTRA-AXIAL:  No mass or collections are depicted.  VASCULATURE:  The major vessels and venous sinuses are grossly patent.  SINUSES AND MASTOIDS:  The coastal thickening is noted in the sinuses. Mastoids are clear.  MISCELLANEOUS:  A partial empty sella is incidentally noted    IMPRESSION:    1)  The MR study confirms an acute left posterior thalamic lacunar infarct, unchanged in size as compared to the prior CT.  2)  underlying white matter disease also noted which may reflect chronic ischemic change. However an inflammatory etiology and/or demyelinating disease  cannot be totally excluded. Further clinical correlation recommended.    --- End of Report ---      < end of copied text >  < from: US Duplex Carotid Arteries Complete, Bilateral (10.21.21 @ 08:51) >  DIST ICA = 43 cm/s  ECA = 66 cm/s    Antegrade flow is noted within both vertebral arteries.    IMPRESSION: No significant hemodynamic stenosis of either carotid artery.    Measurement of carotid stenosis is based on velocity parameters that correlate the residual internal carotid diameter with that of the more distal vessel in accordance with a method such as the [place] Symptomatic Carotid Endarterectomy Trial (NASCET).      --- End of Report ---    < end of copied text >        HEALTH ISSUES - PROBLEM Dx:  Stroke    BRANDO (acute kidney injury)    DVT prophylaxis    Type 2 diabetes mellitus    Hypertensive urgency            Consultant(s) Notes Reviewed:  [ x ] YES     Care Discussed with [X] Consultants  [ x ] Patient  [  ] Family [  ] HCP [x  ]   [x ] Social Service  [x  ] RN, [  ] Physical Therapy,[  ] Palliative care team  DVT PPX: [  ] Lovenox, [ x ] S C Heparin, [  ] Coumadin, [  ] Xarelto, [  ] Eliquis, [  ] Pradaxa, [  ] IV Heparin drip, [  ] SCD [  ] Contraindication 2 to GI Bleed,[  ] Ambulation [  ] Contraindicated 2 to  bleed [  ] Contraindicated 2 to Brain Bleed  Advanced directive: [ x ] None, [  ] DNR/DNI

## 2021-10-21 NOTE — OCCUPATIONAL THERAPY INITIAL EVALUATION ADULT - RANGE OF MOTION EXAMINATION, UPPER EXTREMITY
Muscle strength UE's: 5/5 t/o. Fine motor skills: WNL's/bilateral UE Active ROM was WFL  (within functional limits)

## 2021-10-21 NOTE — PROGRESS NOTE ADULT - PROBLEM SELECTOR PLAN 2
- On admission in ED,  /111, lacunar infarct on CT head.  - Not on home medications  - s/p hydralazine 10mg x2 IVP, Norvasc 5mgx1 now BP of 169/81  - Keep SBP- 120-160 's  - DASH/TLC with carb consistent diet  - Cardio consulted, f/u recs.  - EKG ordered- f/u results - On admission in ED,  /111, lacunar infarct on CT head.  - Not on home medications  - s/p hydralazine 10mg x2 IVP,   - on Norvasc 5mg daily   - DASH/TLC with carb consistent diet  - Cardio consulted, f/u recs. Dr KORY Connor   - EKG ordered- f/u results

## 2021-10-21 NOTE — OCCUPATIONAL THERAPY INITIAL EVALUATION ADULT - ADDITIONAL COMMENTS
Patient lives in an apartment in a house with 1 step to enter. Patient has a stall shower. Patient does not drive. Patient is right hand dominant. Patient performed functional ambulation in hospital room and in hallway with no devices at an independent level. Nephew brings patient grocery shopping. Modified [place] Score: 1.

## 2021-10-21 NOTE — CONSULT NOTE ADULT - PROBLEM SELECTOR RECOMMENDATION 9
cont lantus 10 units qhs  cont low dose admelog correctional scale coverage qac/qhs  cont cons cho diet  goal bg 100-180 in hosp setting
Type 2 A1c pending s/p CVA right side numbness  Recommend endocrine-[facility] on consult  FU appt: TBA- currently has no health insurance  DSC recommendations: needs to start medication for diabetes  diabetes education provided-importance of consistency with medical follow up, taking medication and dietary management. Suggested [facility] as pharmacy to obtain glucometer and supplies as well as low cost medications.   Diabetes support group info and cell # [phone] given   Goal 100-180 mg/dL; 140-180 mg/dL in critical care areas

## 2021-10-21 NOTE — OCCUPATIONAL THERAPY INITIAL EVALUATION ADULT - GENERAL OBSERVATIONS, REHAB EVAL
Patient received supine in bed, +telemonitor and +IV lock right UE. Patient was cooperative during evaluation.

## 2021-10-21 NOTE — SWALLOW BEDSIDE ASSESSMENT ADULT - SLP PERTINENT HISTORY OF CURRENT PROBLEM
r/o dysphagia
Per charting; “Patient is a 52 y/o M with PMH of HTN, DM, HLD, Bell's palsy(2017), presented to ED with right side numbness.”

## 2021-10-21 NOTE — DIETITIAN INITIAL EVALUATION ADULT. - CHIEF COMPLAINT
51y Male with PMH of T2DM, HLD, Bell's Palsey HTN. Pt admitted on 10/20 s/p cerebral infarction complaining of numbness.

## 2021-10-21 NOTE — DISCHARGE NOTE PROVIDER - HOSPITAL COURSE
FROM ADMISSION H+P:   HPI:  Patient is a 50 y/o M with PMH of HTN, DM, HLD, Bell's palsy(2017), presented to ED with right side numbness. Reports he has been feeling intermittent numbness since past 1 month, then yesterday he   woke up with right sided numbness yesterday, which was constant from head to toe, denies associated weakness, slurred speech difficulty swallowing, blurry vision, chest pain or trouble with gait.  Patient  states he thought it would go away but didn't get better. Reports he is not seen by any physician since 2019, at that time he was put on medication metformin and rest of the medication he didn't remember. He ran out of medication in April 2020 and has no medical insurance coverage since then. States numbness is still the same after getting hydralazine and Norvasc in ED. Patient is unvaccinated for COVID. Denies sick contacts or recent travel.     Of note, patient is reluctant on answering few questions. He might be with holding some information regarding his PMH. Patient admits his diet is mostly junk food, he adds raw salt/seasoning/ brooks on top of the meals. 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 Per patient, he was laid off from work years ago, lives alone. Doesn't follow any PCP. Patient is not taking any medications at home.    Vitals: T 98.2F, HR 97, /99, RR 16, SpO2 98  Significant labs: Cr 1.8, Glucose 286, H/H 11.7/34.3  Patient has been given aspirin [id number] , Hydralazine 10mg IVPx2 , Norvasc 5mgx1, atorvastatin 80 mg x1, famotidine 20mg x1  CT head noncontrast: 1)  there are lacunar infarcts in both thalami. A left thalamic lacune may be recent and/or subacute. This may be further assessed with MR imaging. The brain is otherwise unremarkable. 2)  the right sphenoid sinus is moderately opacified. Maxillary sinuses are incompletely imaged but there is thickening on the right.  EKG: sinus rhythm with premature atrial complexes.  (20 Oct 2021 20:48)      ---  HOSPITAL COURSE:   Patient was admitted for stroke work up. CT head noncontrast revealed lacunar infarcts in both thalami, possible left thalamic lacune. MRA revealed no changes of underlying vasculopathy or stenosis. MRI head confirmed an acute left posterior thalamic lacunar infarct, unchanged in size as compared to the prior CT, underlying white matter disease also noted which may reflect chronic ischemic change. However an inflammatory etiology and/or demyelinating disease cannot be totally excluded. Patient was seen by neurology Dr. Johnson. Cardiology - Dr. Connor consulted, TTE ordered and revealed ______. EKG revealed ________.     ---  CONSULTANTS:   Neurology: Dr. Johnson  Cardiology: Dr. Connor   ---  TIME SPENT:  I, the attending physician, was physically present for the key portions of the evaluation and management (E/M) service provided. The total amount of time spent reviewing the hospital notes, laboratory values, imaging findings, assessing/counseling the patient, discussing with consultant physicians, social work, nursing staff was -- minutes     FROM ADMISSION H+P:   HPI:  Patient is a 52 y/o M with PMH of HTN, DM, HLD, Bell's palsy(2017), presented to ED with right side numbness. Reports he has been feeling intermittent numbness since past 1 month, then yesterday he   woke up with right sided numbness yesterday, which was constant from head to toe, denies associated weakness, slurred speech difficulty swallowing, blurry vision, chest pain or trouble with gait.  Patient  states he thought it would go away but didn't get better. Reports he is not seen by any physician since 2019, at that time he was put on medication metformin and rest of the medication he didn't remember. He ran out of medication in April 2020 and has no medical insurance coverage since then. States numbness is still the same after getting hydralazine and Norvasc in ED. Patient is unvaccinated for COVID. Denies sick contacts or recent travel.     Of note, patient is reluctant on answering few questions. He might be with holding some information regarding his PMH. Patient admits his diet is mostly junk food, he adds raw salt/seasoning/ brooks on top of the meals. 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 Per patient, he was laid off from work years ago, lives alone. Doesn't follow any PCP. Patient is not taking any medications at home.    Vitals: T 98.2F, HR 97, /99, RR 16, SpO2 98  Significant labs: Cr 1.8, Glucose 286, H/H 11.7/34.3  Patient has been given aspirin [id number] , Hydralazine 10mg IVPx2 , Norvasc 5mgx1, atorvastatin 80 mg x1, famotidine 20mg x1  CT head noncontrast: 1)  there are lacunar infarcts in both thalami. A left thalamic lacune may be recent and/or subacute. This may be further assessed with MR imaging. The brain is otherwise unremarkable. 2)  the right sphenoid sinus is moderately opacified. Maxillary sinuses are incompletely imaged but there is thickening on the right.  EKG: sinus rhythm with premature atrial complexes.  (20 Oct 2021 20:48)      ---  HOSPITAL COURSE:   Patient was admitted for stroke work up. CT head noncontrast revealed lacunar infarcts in both thalami, possible left thalamic lacune. MRA revealed no changes of underlying vasculopathy or stenosis. MRI head confirmed an acute left posterior thalamic lacunar infarct, unchanged in size as compared to the prior CT, underlying white matter disease also noted which may reflect chronic ischemic change. However an inflammatory etiology and/or demyelinating disease cannot be totally excluded. Patient was seen by neurology Dr. Johnson. Cardiology - Dr. Connor consulted, TTE ordered. EKG revealed sinus tachy no ischemic changes.  Carotid u/s revealed no significant stenosis. Patient  was outside of TPA window. Patient had poor outpatient followup with PMD due to lack of insurance. Patient was started on long term medications for his chronic medical problems. For HTN, he was started on amlodipine and metroprolol tart 25mg BID. For HLD, he was started on statin. For DM, patient was seen by DM educator and he was started on glimeperide 2mg BID and metformin 500mg BID. Medications were sent meds to beds. Patient was seen and examined on day of discharge. Patient medically stable for dc to home with close outpatient followup.    ---  CONSULTANTS:   Neurology: Dr. Johnson  Cardiology: Dr. Connor   ---  T(C): 36.8 (10-22-21 @ 12:30), Max: 36.8 (10-21-21 @ 21:31)  HR: 104 (10-22-21 @ 12:30) (95 - 110)  BP: 171/104 (10-22-21 @ 14:13) (142/90 - 171/104)  RR: 17 (10-22-21 @ 12:30) (17 - 18)  SpO2: 95% (10-22-21 @ 12:30) (94% - 95%)             TIME SPENT:  I, the attending physician, was physically present for the key portions of the evaluation and management (E/M) service provided. The total amount of time spent reviewing the hospital notes, laboratory values, imaging findings, assessing/counseling the patient, discussing with consultant physicians, social work, nursing staff was -- minutes     FROM ADMISSION H+P:   HPI:  Patient is a 52 y/o M with PMH of HTN, DM, HLD, Bell's palsy(2017), presented to ED with right side numbness. Reports he has been feeling intermittent numbness since past 1 month, then yesterday he   woke up with right sided numbness yesterday, which was constant from head to toe, denies associated weakness, slurred speech difficulty swallowing, blurry vision, chest pain or trouble with gait.  Patient  states he thought it would go away but didn't get better. Reports he is not seen by any physician since 2019, at that time he was put on medication metformin and rest of the medication he didn't remember. He ran out of medication in April 2020 and has no medical insurance coverage since then. States numbness is still the same after getting hydralazine and Norvasc in ED. Patient is unvaccinated for COVID. Denies sick contacts or recent travel.     Of note, patient is reluctant on answering few questions. He might be with holding some information regarding his PMH. Patient admits his diet is mostly junk food, he adds raw salt/seasoning/ brooks on top of the meals. 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 Per patient, he was laid off from work years ago, lives alone. Doesn't follow any PCP. Patient is not taking any medications at home.    Vitals: T 98.2F, HR 97, /99, RR 16, SpO2 98  Significant labs: Cr 1.8, Glucose 286, H/H 11.7/34.3  Patient has been given aspirin [id number] , Hydralazine 10mg IVPx2 , Norvasc 5mgx1, atorvastatin 80 mg x1, famotidine 20mg x1  CT head noncontrast: 1)  there are lacunar infarcts in both thalami. A left thalamic lacune may be recent and/or subacute. This may be further assessed with MR imaging. The brain is otherwise unremarkable. 2)  the right sphenoid sinus is moderately opacified. Maxillary sinuses are incompletely imaged but there is thickening on the right.  EKG: sinus rhythm with premature atrial complexes.  (20 Oct 2021 20:48)      ---  HOSPITAL COURSE:   Patient was admitted for stroke work up. CT head noncontrast revealed lacunar infarcts in both thalami, possible left thalamic lacune. MRA revealed no changes of underlying vasculopathy or stenosis. MRI head confirmed an acute left posterior thalamic lacunar infarct, unchanged in size as compared to the prior CT, underlying white matter disease also noted which may reflect chronic ischemic change. However an inflammatory etiology and/or demyelinating disease cannot be totally excluded. Patient was seen by neurology Dr. Johnson. Cardiology - Dr. Connor consulted, TTE ordered. EKG revealed sinus tachy no ischemic changes.  Carotid u/s revealed no significant stenosis. Patient  was outside of TPA window. Patient had poor outpatient followup with PMD due to lack of insurance. Patient was started on long term medications for his chronic medical problems. For HTN, he was started on amlodipine and metroprolol tart 25mg BID. For HLD, he was started on statin. For DM, patient was seen by DM educator and he was started on glimeperide 2mg BID and metformin 500mg BID. Medications were sent meds to beds. Patient was seen and examined on day of discharge. Patient medically stable for dc to home with close outpatient followup.    ---  CONSULTANTS:   Neurology: Dr. Johnson  Cardiology: Dr. Connor   ---  T(C): 36.8 (10-22-21 @ 12:30), Max: 36.8 (10-21-21 @ 21:31)  HR: 104 (10-22-21 @ 12:30) (95 - 110)  BP: 171/104 (10-22-21 @ 14:13) (142/90 - 171/104)  RR: 17 (10-22-21 @ 12:30) (17 - 18)  SpO2: 95% (10-22-21 @ 12:30) (94% - 95%)       PHYSICAL EXAM:  GENERAL:  [x  ] NAD , [ x ] well appearing, [  ] Agitated, [  ] Drowsy,  [  ] Lethargy, [  ] confused   HEAD:  [x  ] Normal, [  ] Other  EYES:  [ x ] EOMI, [ x ] PERRLA, [ x ] conjunctiva and sclera clear normal, [  ] Other,  [  ] Pallor,[  ] Discharge  ENMT:  [ x ] Normal, [ x ] Moist mucous membranes, [x  ] Good dentition, [ x ] No Thrush  NECK:  [ x ] Supple, [x  ] No JVD, [ x ] Normal thyroid, [  ] Lymphadenopathy [  ] Other  CHEST/LUNG:  [x  ] Clear to auscultation bilaterally, [x  ] Breath Sounds equal B/L / Decrease, [x  ] poor effort  [ x ] No rales, [x  ] No rhonchi  [ x ]  No wheezing,   HEART:  [ x ] Regular rate and rhythm, [  ] tachycardia, [  ] Bradycardia,  [  ] irregular  [  ] No murmurs, No rubs, No gallops, [  ] PPM in place (Mfr:  )  ABDOMEN:  [x  ] Soft, [ x ] Nontender, [ x ] Nondistended, [ x ] No mass, [ x ] Bowel sounds present, [x  ] obese  NERVOUS SYSTEM:  [  ] Alert & Oriented X3, [ x ] Nonfocal  [  ] Confusion  [  ] Encephalopathic [  ] Sedated [  ] Unable to assess, [  ] Dementia [  ] Other-  EXTREMITIES: [x  ] 2+ Peripheral Pulses, No clubbing, No cyanosis,  [  ] edema B/L lower EXT. [  ] PVD stasis skin changes B/L Lower EXT, [  ] wound  LYMPH: No lymphadenopathy noted  SKIN:  [ x ] No rashes or lesions, [  ] Pressure Ulcers, [  ] ecchymosis, [  ] Skin Tears, [  ] Other        TIME SPENT:  I, the attending physician, was physically present for the key portions of the evaluation and management (E/M) service provided. The total amount of time spent reviewing the hospital notes, laboratory values, imaging findings, assessing/counseling the patient, discussing with consultant physicians, social work, nursing staff was -- minutes     FROM ADMISSION H+P:   HPI:  Patient is a 52 y/o M with PMH of HTN, DM, HLD, Bell's palsy(2017), presented to ED with right side numbness. Reports he has been feeling intermittent numbness since past 1 month, then yesterday he   woke up with right sided numbness yesterday, which was constant from head to toe, denies associated weakness, slurred speech difficulty swallowing, blurry vision, chest pain or trouble with gait.  Patient  states he thought it would go away but didn't get better. Reports he is not seen by any physician since 2019, at that time he was put on medication metformin and rest of the medication he didn't remember. He ran out of medication in April 2020 and has no medical insurance coverage since then. States numbness is still the same after getting hydralazine and Norvasc in ED. Patient is unvaccinated for COVID. Denies sick contacts or recent travel.     Of note, patient is reluctant on answering few questions. He might be with holding some information regarding his PMH. Patient admits his diet is mostly junk food, he adds raw salt/seasoning/ brooks on top of the meals. 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 Per patient, he was laid off from work years ago, lives alone. Doesn't follow any PCP. Patient is not taking any medications at home.    Vitals: T 98.2F, HR 97, /99, RR 16, SpO2 98  Significant labs: Cr 1.8, Glucose 286, H/H 11.7/34.3  Patient has been given aspirin [id number] , Hydralazine 10mg IVPx2 , Norvasc 5mgx1, atorvastatin 80 mg x1, famotidine 20mg x1  CT head noncontrast: 1)  there are lacunar infarcts in both thalami. A left thalamic lacune may be recent and/or subacute. This may be further assessed with MR imaging. The brain is otherwise unremarkable. 2)  the right sphenoid sinus is moderately opacified. Maxillary sinuses are incompletely imaged but there is thickening on the right.  EKG: sinus rhythm with premature atrial complexes.  (20 Oct 2021 20:48)      ---  HOSPITAL COURSE:   Patient was admitted for stroke work up. CT head noncontrast revealed lacunar infarcts in both thalami, possible left thalamic lacune. MRA revealed no changes of underlying vasculopathy or stenosis. MRI head confirmed an acute left posterior thalamic lacunar infarct, unchanged in size as compared to the prior CT, underlying white matter disease also noted which may reflect chronic ischemic change. However an inflammatory etiology and/or demyelinating disease cannot be totally excluded. Patient was seen by neurology Dr. Johnson. Cardiology - Dr. Connor consulted, TTE ordered. EKG revealed sinus tachy no ischemic changes.  Carotid u/s revealed no significant stenosis. Patient  was outside of TPA window. Patient had poor outpatient followup with PMD  due to lack of insurance. Patient was started on long term medications for his chronic medical problems. For HTN, DM  he was started on amlodipine  5 mg and Metroprolol tart 25mg BID. For HLD, he was started on statin. For DM, patient was seen by DM educator and he was started on glimeperide 2mg BID and metformin 500mg BID. Medications were sent meds to beds. Patient was seen and examined on day of discharge. Patient medically stable for dc to home with close outpatient followup. Nutritional , Speech , PT/OT eval all done , No PT Need.Pt unable to pay for [facility] pharmacy meds , Dr Britton saw pt started on meds, all scripts sent out  to [facility] .Case management saw pt .education provided.    ---  CONSULTANTS:   Neurology: Dr. Johnson  Cardiology: Dr. Connor       TIME SPENT:  I, the attending physician, was physically present for the key portions of the evaluation and management (E/M) service provided. The total amount of time spent reviewing the hospital notes, laboratory values, imaging findings, assessing/counseling the patient, discussing with consultant physicians, social work, nursing staff was 60 minutes

## 2021-10-21 NOTE — PROGRESS NOTE ADULT - PROBLEM SELECTOR PLAN 4
- Pt was on metformin years back, currently not on home medications  - Found to have blood glucose of 286 on admission  - on low dose sliding scale  - F/u AM HbA1c  - F/u UA  - Carb consistent with DASH/TLC diet   - Dr. Perlman and diabetic education consulted- f/u reccs

## 2021-10-21 NOTE — CONSULT NOTE ADULT - SUBJECTIVE AND OBJECTIVE BOX
Patient is a 51y old  Male who presents with a chief complaint of     HPI:  Patient is a 52 y/o M with PMH of HTN, DM, HLD, Bell's palsy(2017), presented to ED with right side numbness. Reports he has been feeling intermittent numbness since past 1 month, then yesterday he   woke up with right sided numbness yesterday, which was constant from head to toe, denies associated weakness, slurred speech difficulty swallowing, blurry vision, chest pain or trouble with gait.  Patient  states he thought it would go away but didn't get better. Reports he is not seen by any physician since 2019, at that time he was put on medication metformin and rest of the medication he didn't remember. He ran out of medication in April 2020 and has no medical insurance coverage since then. States numbness is still the same after getting hydralazine and Norvasc in ED. Patient is unvaccinated for COVID. Denies sick contacts or recent travel.     Of note, patient is reluctant on answering few questions. He might be with holding some information regarding his PMH. Patient admits his diet is mostly junk food, he adds raw salt/seasoning/ brooks on top of the meals. 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 Per patient, he was laid off from work years ago, lives alone. Doesn't follow any PCP. Patient is not taking any medications at home.    Vitals: T 98.2F, HR 97, /99, RR 16, SpO2 98  Significant labs: Cr 1.8, Glucose 286, H/H 11.7/34.3  Patient has been given aspirin [id number] , Hydralazine 10mg IVPx2 , Norvasc 5mgx1, atorvastatin 80 mg x1, famotidine 20mg x1  CT head noncontrast: 1)  there are lacunar infarcts in both thalami. A left thalamic lacune may be recent and/or subacute. This may be further assessed with MR imaging. The brain is otherwise unremarkable. 2)  the right sphenoid sinus is moderately opacified. Maxillary sinuses are incompletely imaged but there is thickening on the right.  EKG: sinus rhythm with premature atrial complexes.  (20 Oct 2021 20:48)      PAST MEDICAL & SURGICAL HISTORY:  Primary hypertension    H/O Bell&#x27;s palsy    Hyperlipemia    Type 2 diabetes mellitus        ECHO FINDINGS: Pending     MEDICATIONS  (STANDING):  amLODIPine   Tablet 5 milliGRAM(s) Oral daily  atorvastatin 80 milliGRAM(s) Oral at bedtime  dextrose 40% Gel 15 Gram(s) Oral once  dextrose 5%. 1000 milliLiter(s) (50 mL/Hr) IV Continuous <Continuous>  dextrose 5%. 1000 milliLiter(s) (100 mL/Hr) IV Continuous <Continuous>  dextrose 50% Injectable 25 Gram(s) IV Push once  dextrose 50% Injectable 12.5 Gram(s) IV Push once  dextrose 50% Injectable 25 Gram(s) IV Push once  famotidine    Tablet 20 milliGRAM(s) Oral daily  glucagon  Injectable 1 milliGRAM(s) IntraMuscular once  heparin   Injectable 5000 Unit(s) SubCutaneous every 12 hours  insulin lispro (ADMELOG) corrective regimen sliding scale   SubCutaneous three times a day before meals  insulin lispro (ADMELOG) corrective regimen sliding scale   SubCutaneous at bedtime    MEDICATIONS  (PRN):      FAMILY HISTORY:  Denies Family history of CAD or early MI    Constitutional: denies fever, chills  HEENT: denies blurry vision, difficulty hearing  Respiratory: denies SOB, AGGARWAL, cough  Cardiovascular: denies CP, palpitations, orthopnea, PND, LE edema  Gastrointestinal: denies nausea, vomiting, abdominal pain  Genitourinary: denies urinary changes  Skin: Denies rashes, itching  Neurologic: denies headache, weakness, dizziness. Right sided numbness upper and lower extremities   Hematology/Oncology: denies bleeding, easy bruising    SOCIAL HISTORY: No tobacco, Alcohol or Drug use    Vital Signs Last 24 Hrs  T(C): 36.6 (21 Oct 2021 04:10), Max: 37 (20 Oct 2021 21:47)  T(F): 97.8 (21 Oct 2021 04:10), Max: 98.6 (20 Oct 2021 21:47)  HR: 89 (21 Oct 2021 04:10) (89 - 102)  BP: 145/84 (21 Oct 2021 04:10) (145/84 - 197/95)  BP(mean): --  RR: 18 (21 Oct 2021 04:10) (16 - 18)  SpO2: 94% (21 Oct 2021 04:10) (94% - 100%)    Physical Exam:  General: Well developed, well nourished, NAD  HEENT: NCAT, EOMI bl, moist mucous membranes   Neck: Supple, nontender, no mass  Neurology: A&Ox3, nonfocal, sensation intact   Respiratory: CTA B/L, No W/R/R  CV: RRR, +S1/S2, no murmurs, rubs or gallops  Abdominal: Soft, NT, ND +BSx4, no palpable masses  Extremities: No C/C/E, + peripheral pulses  MSK: Normal ROM, no joint erythema or warmth, no joint swelling   Heme: No obvious ecchymosis or petechiae   Skin: warm, dry, normal color    ECG: NSR, with nonspecific ST wave abnormalities     I&O's Detail      LABS:                        11.7   5.77  )-----------( 329      ( 20 Oct 2021 18:56 )             34.3     10-20    138  |  103  |  22  ----------------------------<  286<H>  4.4   |  30  |  1.80<H>    Ca    9.3      20 Oct 2021 18:56    TPro  7.8  /  Alb  2.8<L>  /  TBili  0.2  /  DBili  x   /  AST  12<L>  /  ALT  19  /  AlkPhos  119  10-20    CARDIAC MARKERS ( 20 Oct 2021 18:56 )  <.015 ng/mL / x     / x     / x     / <1.0 ng/mL      PT/INR - ( 20 Oct 2021 18:56 )   PT: 11.9 sec;   INR: 1.02 ratio         PTT - ( 20 Oct 2021 18:56 )  PTT:28.6 sec    I&O's Summary    BNP  RADIOLOGY & ADDITIONAL STUDIES: Patient is a 51y old  Male who presents with a chief complaint of     HPI:  Patient is a 50 y/o M with PMH of HTN, DM, HLD, Bell's palsy(2017), presented to ED with right side numbness. Reports he has been feeling intermittent numbness since past 1 month, then yesterday he   woke up with right sided numbness yesterday, which was constant from head to toe, denies associated weakness, slurred speech difficulty swallowing, blurry vision, chest pain or trouble with gait.  Patient  states he thought it would go away but didn't get better. Reports he is not seen by any physician since 2019, at that time he was put on medication metformin and rest of the medication he didn't remember. He ran out of medication in April 2020 and has no medical insurance coverage since then. States numbness is still the same after getting hydralazine and Norvasc in ED. Patient is unvaccinated for COVID. Denies sick contacts or recent travel.     Of note, patient is reluctant on answering few questions. He might be with holding some information regarding his PMH. Patient admits his diet is mostly junk food, he adds raw salt/seasoning/ brooks on top of the meals. 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 Per patient, he was laid off from work years ago, lives alone. Doesn't follow any PCP. Patient is not taking any medications at home.    Vitals: T 98.2F, HR 97, /99, RR 16, SpO2 98  Significant labs: Cr 1.8, Glucose 286, H/H 11.7/34.3  Patient has been given aspirin [id number] , Hydralazine 10mg IVPx2 , Norvasc 5mgx1, atorvastatin 80 mg x1, famotidine 20mg x1  CT head noncontrast: 1)  there are lacunar infarcts in both thalami. A left thalamic lacune may be recent and/or subacute. This may be further assessed with MR imaging. The brain is otherwise unremarkable. 2)  the right sphenoid sinus is moderately opacified. Maxillary sinuses are incompletely imaged but there is thickening on the right.      EKG: sinus rhythm with premature atrial complexes.  (20 Oct 2021 20:48)    interval hx: Denies any chest pain, dyspnea, palpitations, PND, orthopnea, near syncope, syncope, lower extremity edema, stroke like symptoms. Has numbness on right side     PAST MEDICAL & SURGICAL HISTORY:  Primary hypertension    H/O Bell&#x27;s palsy    Hyperlipemia    Type 2 diabetes mellitus        ECHO FINDINGS: Pending     MEDICATIONS  (STANDING):  amLODIPine   Tablet 5 milliGRAM(s) Oral daily  atorvastatin 80 milliGRAM(s) Oral at bedtime  dextrose 40% Gel 15 Gram(s) Oral once  dextrose 5%. 1000 milliLiter(s) (50 mL/Hr) IV Continuous <Continuous>  dextrose 5%. 1000 milliLiter(s) (100 mL/Hr) IV Continuous <Continuous>  dextrose 50% Injectable 25 Gram(s) IV Push once  dextrose 50% Injectable 12.5 Gram(s) IV Push once  dextrose 50% Injectable 25 Gram(s) IV Push once  famotidine    Tablet 20 milliGRAM(s) Oral daily  glucagon  Injectable 1 milliGRAM(s) IntraMuscular once  heparin   Injectable 5000 Unit(s) SubCutaneous every 12 hours  insulin lispro (ADMELOG) corrective regimen sliding scale   SubCutaneous three times a day before meals  insulin lispro (ADMELOG) corrective regimen sliding scale   SubCutaneous at bedtime    MEDICATIONS  (PRN):      FAMILY HISTORY:  Denies Family history of CAD or early MI    Constitutional: denies fever, chills  HEENT: denies blurry vision, difficulty hearing  Respiratory: denies SOB, AGGARWAL, cough  Cardiovascular: denies CP, palpitations, orthopnea, PND, LE edema  Gastrointestinal: denies nausea, vomiting, abdominal pain  Genitourinary: denies urinary changes  Skin: Denies rashes, itching  Neurologic: denies headache, weakness, dizziness. Right sided numbness upper and lower extremities   Hematology/Oncology: denies bleeding, easy bruising    SOCIAL HISTORY: No tobacco, Alcohol or Drug use    Vital Signs Last 24 Hrs  T(C): 36.6 (21 Oct 2021 04:10), Max: 37 (20 Oct 2021 21:47)  T(F): 97.8 (21 Oct 2021 04:10), Max: 98.6 (20 Oct 2021 21:47)  HR: 89 (21 Oct 2021 04:10) (89 - 102)  BP: 145/84 (21 Oct 2021 04:10) (145/84 - 197/95)  BP(mean): --  RR: 18 (21 Oct 2021 04:10) (16 - 18)  SpO2: 94% (21 Oct 2021 04:10) (94% - 100%)    Physical Exam:  General: Well developed, well nourished, NAD  HEENT: NCAT, EOMI bl, moist mucous membranes   Neck: Supple, nontender, no mass  Neurology: A&Ox3, nonfocal, sensation intact   Respiratory: CTA B/L, No W/R/R  CV: RRR, +S1/S2, no murmurs, rubs or gallops  Abdominal: Soft, NT, ND +BSx4, no palpable masses  Extremities: No C/C/E, + peripheral pulses  MSK: Normal ROM, no joint erythema or warmth, no joint swelling   Heme: No obvious ecchymosis or petechiae   Skin: warm, dry, normal color    ECG: NSR, with nonspecific ST wave abnormalities     I&O's Detail      LABS:                        11.7   5.77  )-----------( 329      ( 20 Oct 2021 18:56 )             34.3     10-20    138  |  103  |  22  ----------------------------<  286<H>  4.4   |  30  |  1.80<H>    Ca    9.3      20 Oct 2021 18:56    TPro  7.8  /  Alb  2.8<L>  /  TBili  0.2  /  DBili  x   /  AST  12<L>  /  ALT  19  /  AlkPhos  119  10-20    CARDIAC MARKERS ( 20 Oct 2021 18:56 )  <.015 ng/mL / x     / x     / x     / <1.0 ng/mL      PT/INR - ( 20 Oct 2021 18:56 )   PT: 11.9 sec;   INR: 1.02 ratio         PTT - ( 20 Oct 2021 18:56 )  PTT:28.6 sec    I&O's Summary    BNP  RADIOLOGY & ADDITIONAL STUDIES:

## 2021-10-21 NOTE — DISCHARGE NOTE PROVIDER - NSDCFUADDINST_GEN_ALL_CORE_FT
You MUST follow up at clinic in 2 weeks to check your Blood pressures & to adjust your medications  You MUST follow up with Endocrinologist for you Diabetes at clinic   Follow your diet & exercise as discussed.  Continue All medications as directed

## 2021-10-21 NOTE — CONSULT NOTE ADULT - CONSULT REASON
Abnormal renal function
HTN urgency, Stroke
dm uncontrolled
51y  diabetes mellitus uncontrolled type 2

## 2021-10-21 NOTE — PROGRESS NOTE ADULT - PROBLEM SELECTOR PLAN 1
Patient presenting with right sided numbness, with no other neurological symptoms. Found to have lacunar infarct on CT in ED  - Admit to F.  - Patient has been given aspirin 325mg  - CT head noncontrast: lacunar infarcts in both thalami. A left thalamic lacune may be recent and/or subacute.  - F/u TTE, carotid dopplers  - MRA- No changes of underlying vasculopathy or stenosis.  - MRI head-  1)  The MR study confirms an acute left posterior thalamic lacunar infarct, unchanged in size as compared to the prior CT.  2)  underlying white matter disease also noted which may reflect chronic ischemic change. However an inflammatory etiology and/or demyelinating disease cannot be totally excluded. Further clinical correlation recommended.  - Q4 Neuro checks  - Goal BP: SBP <150, SBP >130   - Start asa 325 mg  daily, statin daily, Pepcid daily   - Fall precaution.  - PT and OT consultation; independent with full weight bearing   - Check A1c, f/u lipid panel   - ER called neurology, Dr. Johnson, f/u recs  -Avoid hypotension 2/2 Ac CVA  - Cardio consulted, f/u recs. Patient presenting with right sided numbness, with no other neurological symptoms. Found to have lacunar infarct on CT in ED  - Admit to F.  - Patient has been given aspirin 325mg  - CT head noncontrast: lacunar infarcts in both thalami. A left thalamic lacune may be recent and/or subacute.  - F/u TTE, carotid dopplers  - MRA- No changes of underlying vasculopathy or stenosis.  - MRI head-  1)  The MR study confirms an acute left posterior thalamic lacunar infarct, unchanged in size as compared to the prior CT.  2)  underlying white matter disease also noted which may reflect chronic ischemic change. However an inflammatory etiology and/or demyelinating disease cannot be totally excluded. Further clinical correlation recommended.  - Q4 Neuro checks  - Goal BP: SBP <150, SBP >130   - Start asa 325 mg  daily, statin daily, Pepcid daily   - PT and OT consultation; independent with full weight bearing   - speech and swallow- regular diet with thin liquids   - Check A1c, f/u lipid panel   - ER called neurology, Dr. Johnson, f/u recs  -Avoid hypotension 2/2 Ac CVA  - Cardio consulted, f/u recs.

## 2021-10-22 ENCOUNTER — TRANSCRIPTION ENCOUNTER (OUTPATIENT)
Age: 51
End: 2021-10-22

## 2021-10-22 VITALS
SYSTOLIC BLOOD PRESSURE: 153 MMHG | DIASTOLIC BLOOD PRESSURE: 99 MMHG | HEART RATE: 86 BPM | TEMPERATURE: 99 F | OXYGEN SATURATION: 98 % | RESPIRATION RATE: 18 BRPM

## 2021-10-22 PROBLEM — Z00.00 ENCOUNTER FOR PREVENTIVE HEALTH EXAMINATION: Status: ACTIVE | Noted: 2021-10-22

## 2021-10-22 LAB
A1C WITH ESTIMATED AVERAGE GLUCOSE RESULT: 10 % — HIGH (ref 4–5.6)
A1C WITH ESTIMATED AVERAGE GLUCOSE RESULT: 10.4 % — HIGH (ref 4–5.6)
ALBUMIN SERPL ELPH-MCNC: 2.4 G/DL — LOW (ref 3.3–5)
ALP SERPL-CCNC: 100 U/L — SIGNIFICANT CHANGE UP (ref 40–120)
ALT FLD-CCNC: 16 U/L — SIGNIFICANT CHANGE UP (ref 12–78)
ANION GAP SERPL CALC-SCNC: 5 MMOL/L — SIGNIFICANT CHANGE UP (ref 5–17)
APPEARANCE UR: CLEAR — SIGNIFICANT CHANGE UP
AST SERPL-CCNC: 14 U/L — LOW (ref 15–37)
BACTERIA # UR AUTO: NEGATIVE — SIGNIFICANT CHANGE UP
BASOPHILS # BLD AUTO: 0.04 K/UL — SIGNIFICANT CHANGE UP (ref 0–0.2)
BASOPHILS NFR BLD AUTO: 0.7 % — SIGNIFICANT CHANGE UP (ref 0–2)
BILIRUB SERPL-MCNC: 0.5 MG/DL — SIGNIFICANT CHANGE UP (ref 0.2–1.2)
BILIRUB UR-MCNC: NEGATIVE — SIGNIFICANT CHANGE UP
BUN SERPL-MCNC: 21 MG/DL — SIGNIFICANT CHANGE UP (ref 7–23)
CALCIUM SERPL-MCNC: 9 MG/DL — SIGNIFICANT CHANGE UP (ref 8.5–10.1)
CHLORIDE SERPL-SCNC: 104 MMOL/L — SIGNIFICANT CHANGE UP (ref 96–108)
CO2 SERPL-SCNC: 29 MMOL/L — SIGNIFICANT CHANGE UP (ref 22–31)
COLOR SPEC: YELLOW — SIGNIFICANT CHANGE UP
CREAT SERPL-MCNC: 1.7 MG/DL — HIGH (ref 0.5–1.3)
DIFF PNL FLD: ABNORMAL
EOSINOPHIL # BLD AUTO: 0.24 K/UL — SIGNIFICANT CHANGE UP (ref 0–0.5)
EOSINOPHIL NFR BLD AUTO: 4.2 % — SIGNIFICANT CHANGE UP (ref 0–6)
EPI CELLS # UR: SIGNIFICANT CHANGE UP
ESTIMATED AVERAGE GLUCOSE: 240 MG/DL — HIGH (ref 68–114)
ESTIMATED AVERAGE GLUCOSE: 252 MG/DL — HIGH (ref 68–114)
GLUCOSE SERPL-MCNC: 207 MG/DL — HIGH (ref 70–99)
GLUCOSE UR QL: 250
GRAN CASTS # UR COMP ASSIST: ABNORMAL /LPF
HCT VFR BLD CALC: 32.5 % — LOW (ref 39–50)
HGB BLD-MCNC: 11.2 G/DL — LOW (ref 13–17)
IMM GRANULOCYTES NFR BLD AUTO: 0.2 % — SIGNIFICANT CHANGE UP (ref 0–1.5)
KETONES UR-MCNC: NEGATIVE — SIGNIFICANT CHANGE UP
LEUKOCYTE ESTERASE UR-ACNC: NEGATIVE — SIGNIFICANT CHANGE UP
LYMPHOCYTES # BLD AUTO: 1.85 K/UL — SIGNIFICANT CHANGE UP (ref 1–3.3)
LYMPHOCYTES # BLD AUTO: 32.2 % — SIGNIFICANT CHANGE UP (ref 13–44)
MCHC RBC-ENTMCNC: 27.7 PG — SIGNIFICANT CHANGE UP (ref 27–34)
MCHC RBC-ENTMCNC: 34.5 GM/DL — SIGNIFICANT CHANGE UP (ref 32–36)
MCV RBC AUTO: 80.4 FL — SIGNIFICANT CHANGE UP (ref 80–100)
MONOCYTES # BLD AUTO: 0.64 K/UL — SIGNIFICANT CHANGE UP (ref 0–0.9)
MONOCYTES NFR BLD AUTO: 11.1 % — SIGNIFICANT CHANGE UP (ref 2–14)
NEUTROPHILS # BLD AUTO: 2.96 K/UL — SIGNIFICANT CHANGE UP (ref 1.8–7.4)
NEUTROPHILS NFR BLD AUTO: 51.6 % — SIGNIFICANT CHANGE UP (ref 43–77)
NITRITE UR-MCNC: NEGATIVE — SIGNIFICANT CHANGE UP
NRBC # BLD: 0 /100 WBCS — SIGNIFICANT CHANGE UP (ref 0–0)
PH UR: 5 — SIGNIFICANT CHANGE UP (ref 5–8)
PLATELET # BLD AUTO: 301 K/UL — SIGNIFICANT CHANGE UP (ref 150–400)
POTASSIUM SERPL-MCNC: 3.7 MMOL/L — SIGNIFICANT CHANGE UP (ref 3.5–5.3)
POTASSIUM SERPL-SCNC: 3.7 MMOL/L — SIGNIFICANT CHANGE UP (ref 3.5–5.3)
PROT SERPL-MCNC: 6.6 G/DL — SIGNIFICANT CHANGE UP (ref 6–8.3)
PROT UR-MCNC: 100
RBC # BLD: 4.04 M/UL — LOW (ref 4.2–5.8)
RBC # FLD: 13.1 % — SIGNIFICANT CHANGE UP (ref 10.3–14.5)
RBC CASTS # UR COMP ASSIST: ABNORMAL /HPF (ref 0–4)
SODIUM SERPL-SCNC: 138 MMOL/L — SIGNIFICANT CHANGE UP (ref 135–145)
SP GR SPEC: 1.01 — SIGNIFICANT CHANGE UP (ref 1.01–1.02)
UROBILINOGEN FLD QL: NEGATIVE — SIGNIFICANT CHANGE UP
WBC # BLD: 5.74 K/UL — SIGNIFICANT CHANGE UP (ref 3.8–10.5)
WBC # FLD AUTO: 5.74 K/UL — SIGNIFICANT CHANGE UP (ref 3.8–10.5)
WBC UR QL: SIGNIFICANT CHANGE UP

## 2021-10-22 PROCEDURE — 99232 SBSQ HOSP IP/OBS MODERATE 35: CPT

## 2021-10-22 PROCEDURE — 99253 IP/OBS CNSLTJ NEW/EST LOW 45: CPT

## 2021-10-22 RX ORDER — MIRTAZAPINE 45 MG/1
1 TABLET, ORALLY DISINTEGRATING ORAL
Qty: 3 | Refills: 0
Start: 2021-10-22 | End: 2021-10-24

## 2021-10-22 RX ORDER — FAMOTIDINE 10 MG/ML
1 INJECTION INTRAVENOUS
Qty: 0 | Refills: 0 | DISCHARGE
Start: 2021-10-22

## 2021-10-22 RX ORDER — MIRTAZAPINE 45 MG/1
1 TABLET, ORALLY DISINTEGRATING ORAL
Qty: 30 | Refills: 0
Start: 2021-10-22 | End: 2021-11-20

## 2021-10-22 RX ORDER — METOPROLOL TARTRATE 50 MG
25 TABLET ORAL
Refills: 0 | Status: DISCONTINUED | OUTPATIENT
Start: 2021-10-22 | End: 2021-10-22

## 2021-10-22 RX ORDER — ATORVASTATIN CALCIUM 80 MG/1
1 TABLET, FILM COATED ORAL
Qty: 30 | Refills: 0
Start: 2021-10-22 | End: 2021-11-20

## 2021-10-22 RX ORDER — METFORMIN HYDROCHLORIDE 850 MG/1
1 TABLET ORAL
Qty: 0 | Refills: 0 | DISCHARGE
Start: 2021-10-22

## 2021-10-22 RX ORDER — AMLODIPINE BESYLATE 2.5 MG/1
1 TABLET ORAL
Qty: 30 | Refills: 0
Start: 2021-10-22 | End: 2021-11-20

## 2021-10-22 RX ORDER — METOPROLOL TARTRATE 50 MG
1 TABLET ORAL
Qty: 60 | Refills: 0
Start: 2021-10-22 | End: 2021-11-20

## 2021-10-22 RX ORDER — GLIMEPIRIDE 1 MG
1 TABLET ORAL
Qty: 60 | Refills: 0
Start: 2021-10-22 | End: 2021-11-20

## 2021-10-22 RX ORDER — INSULIN GLARGINE 100 [IU]/ML
15 INJECTION, SOLUTION SUBCUTANEOUS AT BEDTIME
Refills: 0 | Status: DISCONTINUED | OUTPATIENT
Start: 2021-10-22 | End: 2021-10-22

## 2021-10-22 RX ORDER — ASPIRIN/CALCIUM CARB/MAGNESIUM 324 MG
1 TABLET ORAL
Qty: 30 | Refills: 0
Start: 2021-10-22 | End: 2021-11-20

## 2021-10-22 RX ORDER — GLIMEPIRIDE 1 MG
1 TABLET ORAL
Qty: 6 | Refills: 0
Start: 2021-10-22 | End: 2021-10-24

## 2021-10-22 RX ORDER — MIRTAZAPINE 45 MG/1
7.5 TABLET, ORALLY DISINTEGRATING ORAL AT BEDTIME
Refills: 0 | Status: DISCONTINUED | OUTPATIENT
Start: 2021-10-22 | End: 2021-10-22

## 2021-10-22 RX ORDER — FAMOTIDINE 10 MG/ML
1 INJECTION INTRAVENOUS
Qty: 30 | Refills: 0
Start: 2021-10-22 | End: 2021-11-20

## 2021-10-22 RX ORDER — ASPIRIN/CALCIUM CARB/MAGNESIUM 324 MG
325 TABLET ORAL DAILY
Refills: 0 | Status: DISCONTINUED | OUTPATIENT
Start: 2021-10-22 | End: 2021-10-22

## 2021-10-22 RX ORDER — INSULIN LISPRO 100/ML
5 VIAL (ML) SUBCUTANEOUS
Refills: 0 | Status: DISCONTINUED | OUTPATIENT
Start: 2021-10-22 | End: 2021-10-22

## 2021-10-22 RX ORDER — METOPROLOL TARTRATE 50 MG
1 TABLET ORAL
Qty: 0 | Refills: 0 | DISCHARGE
Start: 2021-10-22

## 2021-10-22 RX ORDER — ASPIRIN/CALCIUM CARB/MAGNESIUM 324 MG
1 TABLET ORAL
Qty: 0 | Refills: 0 | DISCHARGE
Start: 2021-10-22

## 2021-10-22 RX ORDER — METFORMIN HYDROCHLORIDE 850 MG/1
500 TABLET ORAL
Refills: 0 | Status: DISCONTINUED | OUTPATIENT
Start: 2021-10-22 | End: 2021-10-22

## 2021-10-22 RX ORDER — ATORVASTATIN CALCIUM 80 MG/1
1 TABLET, FILM COATED ORAL
Qty: 0 | Refills: 0 | DISCHARGE
Start: 2021-10-22

## 2021-10-22 RX ORDER — SIMETHICONE 80 MG/1
80 TABLET, CHEWABLE ORAL ONCE
Refills: 0 | Status: COMPLETED | OUTPATIENT
Start: 2021-10-22 | End: 2021-10-22

## 2021-10-22 RX ORDER — METFORMIN HYDROCHLORIDE 850 MG/1
1 TABLET ORAL
Qty: 60 | Refills: 0
Start: 2021-10-22 | End: 2021-11-20

## 2021-10-22 RX ORDER — ATORVASTATIN CALCIUM 80 MG/1
1 TABLET, FILM COATED ORAL
Qty: 3 | Refills: 0
Start: 2021-10-22 | End: 2021-10-24

## 2021-10-22 RX ORDER — METFORMIN HYDROCHLORIDE 850 MG/1
1 TABLET ORAL
Qty: 6 | Refills: 0
Start: 2021-10-22 | End: 2021-10-24

## 2021-10-22 RX ORDER — ASPIRIN/CALCIUM CARB/MAGNESIUM 324 MG
1 TABLET ORAL
Qty: 3 | Refills: 0
Start: 2021-10-22 | End: 2021-10-24

## 2021-10-22 RX ORDER — FAMOTIDINE 10 MG/ML
1 INJECTION INTRAVENOUS
Qty: 3 | Refills: 0
Start: 2021-10-22 | End: 2021-10-24

## 2021-10-22 RX ORDER — AMLODIPINE BESYLATE 2.5 MG/1
1 TABLET ORAL
Qty: 3 | Refills: 0
Start: 2021-10-22 | End: 2021-10-24

## 2021-10-22 RX ORDER — METOPROLOL TARTRATE 50 MG
1 TABLET ORAL
Qty: 6 | Refills: 0
Start: 2021-10-22 | End: 2021-10-24

## 2021-10-22 RX ADMIN — Medication 325 MILLIGRAM(S): at 12:04

## 2021-10-22 RX ADMIN — FAMOTIDINE 20 MILLIGRAM(S): 10 INJECTION INTRAVENOUS at 12:15

## 2021-10-22 RX ADMIN — HEPARIN SODIUM 5000 UNIT(S): 5000 INJECTION INTRAVENOUS; SUBCUTANEOUS at 17:22

## 2021-10-22 RX ADMIN — Medication 5 UNIT(S): at 08:31

## 2021-10-22 RX ADMIN — Medication 5 UNIT(S): at 17:23

## 2021-10-22 RX ADMIN — Medication 25 MILLIGRAM(S): at 14:14

## 2021-10-22 RX ADMIN — Medication 2: at 17:22

## 2021-10-22 RX ADMIN — Medication 2: at 08:04

## 2021-10-22 RX ADMIN — METFORMIN HYDROCHLORIDE 500 MILLIGRAM(S): 850 TABLET ORAL at 17:25

## 2021-10-22 RX ADMIN — Medication 5 UNIT(S): at 12:04

## 2021-10-22 RX ADMIN — SIMETHICONE 80 MILLIGRAM(S): 80 TABLET, CHEWABLE ORAL at 00:18

## 2021-10-22 RX ADMIN — Medication 3: at 12:03

## 2021-10-22 NOTE — PROGRESS NOTE ADULT - PROBLEM SELECTOR PLAN 2
- On admission in ED,  /111, lacunar infarct on CT head.  - Not on home medications  - on Norvasc 5mg daily   - DASH/TLC with carb consistent diet  - Cardio consulted, f/u recs. will dc on metoprolol tart and amlodipine  - EKG ordered- f/u results

## 2021-10-22 NOTE — PROGRESS NOTE ADULT - ASSESSMENT
Physical Exam:   Vital Signs Last 24 Hrs  T(C): 36.4 (22 Oct 2021 04:36), Max: 36.9 (21 Oct 2021 11:33)  T(F): 97.5 (22 Oct 2021 04:36), Max: 98.4 (21 Oct 2021 11:33)  HR: 95 (22 Oct 2021 04:36) (95 - 110)  BP: 148/92 (22 Oct 2021 04:36) (148/92 - 166/96)  BP(mean): --  RR: 18 (22 Oct 2021 04:36) (17 - 18)  SpO2: 94% (22 Oct 2021 04:36) (94% - 97%)    General: NAD, denies Fever, chills     eGFR if Non African American: 46 mL/min/1.73M2 (10-22-21 @ 06:44)  eGFR if African American: 53 mL/min/1.73M2 (10-22-21 @ 06:44)  eGFR if Non African American: 46 mL/min/1.73M2 (10-21-21 @ 07:46)  eGFR if : 53 mL/min/1.73M2 (10-21-21 @ 07:46)  eGFR if Non African American: 43 mL/min/1.73M2 (10-20-21 @ 18:56)  eGFR if : 50 mL/min/1.73M2 (10-20-21 @ 18:56)      CAPILLARY BLOOD GLUCOSE      POCT Blood Glucose.: 218 mg/dL (22 Oct 2021 07:48)  POCT Blood Glucose.: 283 mg/dL (21 Oct 2021 21:27)  POCT Blood Glucose.: 224 mg/dL (21 Oct 2021 16:47)  POCT Blood Glucose.: 244 mg/dL (21 Oct 2021 11:29)      Cholesterol, Serum: 113 mg/dL (05.19.21 @ 08:36)     HDL Cholesterol, Serum: 22 mg/dL (05.19.21 @ 08:36)     LDL Cholesterol Calculated: 66 mg/dL (05.19.21 @ 08:36)     DIET: CC

## 2021-10-22 NOTE — PROGRESS NOTE ADULT - SUBJECTIVE AND OBJECTIVE BOX
Neurology Follow up note    PHILIPPE [id number]    HPI:  Patient is a 52 y/o M with PMH of HTN, DM, HLD, Bell's palsy(), presented to ED with right side numbness. Reports he has been feeling intermittent numbness since past 1 month, then yesterday he   woke up with right sided numbness yesterday, which was constant from head to toe, denies associated weakness, slurred speech difficulty swallowing, blurry vision, chest pain or trouble with gait.  Patient  states he thought it would go away but didn't get better. Reports he is not seen by any physician since , at that time he was put on medication metformin and rest of the medication he didn't remember. He ran out of medication in 2020 and has no medical insurance coverage since then. States numbness is still the same after getting hydralazine and Norvasc in ED. Patient is unvaccinated for COVID. Denies sick contacts or recent travel.     Of note, patient is reluctant on answering few questions. He might be with holding some information regarding his PMH. Patient admits his diet is mostly junk food, he adds raw salt/seasoning/ brooks on top of the meals. 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 Per patient, he was laid off from work years ago, lives alone. Doesn't follow any PCP. Patient is not taking any medications at home.    Vitals: T 98.2F, HR 97, /99, RR 16, SpO2 98  Significant labs: Cr 1.8, Glucose 286, H/H 11.7/34.3  Patient has been given aspirin [id number] , Hydralazine 10mg IVPx2 , Norvasc 5mgx1, atorvastatin 80 mg x1, famotidine 20mg x1  CT head noncontrast: 1)  there are lacunar infarcts in both thalami. A left thalamic lacune may be recent and/or subacute. This may be further assessed with MR imaging. The brain is otherwise unremarkable. 2)  the right sphenoid sinus is moderately opacified. Maxillary sinuses are incompletely imaged but there is thickening on the right.  EKG: sinus rhythm with premature atrial complexes.  (20 Oct 2021 20:48)      Interval History -no new events    Patient is seen, chart was reviewed and case was discussed with the treatment team.  Pt is not in any distress.   Lying on bed comfortably.     Vital Signs Last 24 Hrs  T(C): 36.4 (22 Oct 2021 04:36), Max: 36.9 (21 Oct 2021 11:33)  T(F): 97.5 (22 Oct 2021 04:36), Max: 98.4 (21 Oct 2021 11:33)  HR: 95 (22 Oct 2021 04:36) (95 - 110)  BP: 148/92 (22 Oct 2021 04:36) (148/92 - 160/97)  BP(mean): --  RR: 18 (22 Oct 2021 04:36) (17 - 18)  SpO2: 94% (22 Oct 2021 04:36) (94% - 97%)        REVIEW OF SYSTEMS:    Constitutional: No fever,   Eyes: No eye pain, visual disturbances, or discharge  ENT:  No difficulty hearing, tinnitus, vertigo; No sinus or throat pain  Neck: No pain or stiffness  Respiratory: No cough, wheezing, chills or hemoptysis  Cardiovascular: No chest pain, palpitations, shortness of breath, dizziness or leg swelling  Gastrointestinal: No abdominal or epigastric pain. No nausea, vomiting  Genitourinary: No dysuria, frequency, hematuria or incontinence  Neurological: No headaches, memory loss, loss of strength, n  Psychiatric: No depression, anxiety, mood swings or difficulty sleeping  Musculoskeletal: No joint pain or swelling; No muscle, back or extremity pain  Skin: No itching, burning, rashes or lesions   Lymph Nodes: No enlarged glands  Endocrine: No heat or cold intolerance; No hair loss,   Allergy and Immunologic: No hives or eczema    On Neurological Examination:    Mental Status - Pt is alert, awake, oriented X3. Higher functions are intact. Follows commands well and able to answer questions appropriately.Mood and affect  normal    Speech -  Normal.    Cranial Nerves - Pupils 3 mm equal and reactive to light, extraocular eye movements intact. Pt has no visual field deficit.  Pt has no  facial asymmetry. Facial sensation is intact.Tongue - is in midline.    Muscle tone - is normal all over. Moves all extremities equally. No asymmetry is seen.      Motor Exam - 5/5 all over, No drift. No shaking or tremors.    Sensory Exam - Pin prick, temperature, joint position and vibration are intact on either side. Pt withdraws all extremities equally on stimulation. No asymmetry seen. No complaints of tingling, numbness.    Gait - Able to stand and walk unassisted.     coordination:    Finger to nose: normal      Deep tendon Reflexes - 2 plus all over.          Neck Supple -  Yes.     MEDICATIONS    amLODIPine   Tablet 5 milliGRAM(s) Oral daily  atorvastatin 80 milliGRAM(s) Oral at bedtime  dextrose 40% Gel 15 Gram(s) Oral once  dextrose 40% Gel 15 Gram(s) Oral once  dextrose 5%. 1000 milliLiter(s) IV Continuous <Continuous>  dextrose 5%. 1000 milliLiter(s) IV Continuous <Continuous>  dextrose 5%. 1000 milliLiter(s) IV Continuous <Continuous>  dextrose 50% Injectable 25 Gram(s) IV Push once  dextrose 50% Injectable 12.5 Gram(s) IV Push once  dextrose 50% Injectable 25 Gram(s) IV Push once  famotidine    Tablet 20 milliGRAM(s) Oral daily  glucagon  Injectable 1 milliGRAM(s) IntraMuscular once  heparin   Injectable 5000 Unit(s) SubCutaneous every 12 hours  insulin glargine Injectable (LANTUS) 15 Unit(s) SubCutaneous at bedtime  insulin lispro (ADMELOG) corrective regimen sliding scale   SubCutaneous three times a day before meals  insulin lispro (ADMELOG) corrective regimen sliding scale   SubCutaneous at bedtime  insulin lispro Injectable (ADMELOG) 5 Unit(s) SubCutaneous three times a day before meals      Allergies    No Known Drug Allergies  shellfish (Short breath)    Intolerances        LABS:  CBC Full  -  ( 22 Oct 2021 06:44 )  WBC Count : 5.74 K/uL  RBC Count : 4.04 M/uL  Hemoglobin : 11.2 g/dL  Hematocrit : 32.5 %  Platelet Count - Automated : 301 K/uL  Mean Cell Volume : 80.4 fl  Mean Cell Hemoglobin : 27.7 pg  Mean Cell Hemoglobin Concentration : 34.5 gm/dL    Urinalysis Basic - ( 22 Oct 2021 08:08 )    Color: Yellow / Appearance: Clear / S.015 / pH: x  Gluc: x / Ketone: Negative  / Bili: Negative / Urobili: Negative   Blood: x / Protein: 100 / Nitrite: Negative   Leuk Esterase: Negative / RBC: 3-5 /HPF / WBC 3-5   Sq Epi: x / Non Sq Epi: Occasional / Bacteria: Negative      10-22    138  |  104  |  21  ----------------------------<  207<H>  3.7   |  29  |  1.70<H>    Ca    9.0      22 Oct 2021 06:44    TPro  6.6  /  Alb  2.4<L>  /  TBili  0.5  /  DBili  x   /  AST  14<L>  /  ALT  16  /  AlkPhos  100  10-22    Hemoglobin A1C:   Lipid Panel 10-21 @ 22:25  Total Cholesterol, Serum 275  LDL --  Triglycerides 238    Vitamin B12     RADIOLOGY  < from: MR Head No Cont (10.21.21 @ 09:16) >    EXAM:  MR BRAIN                            PROCEDURE DATE:  10/21/2021          INTERPRETATION:  INDICATION:    Stroke  TECHNIQUE:  Multiplanar brain imaging was conducted. T1, T2 and FLAIR imaging was performed.  In addition, diffusion imaging, diffusion coefficient assessment (ADC) and T2* was incorporated . No contrast administered.  COMPARISON EXAMINATION:  CT 10/20/2021    FINDINGS:    HEMISPHERES: There is diffusion restriction in the left thalamic region corresponding to the area of hypodensity on CT. The appearance is consistent with a small acute left thalamic lacunar infarct. Multiple white matter lesions are noted in both hemispheres, many of which are periventricular. A differential includes underlying chronic ischemic changes, although demyelinating disease and/or an inflammatory etiology cannot be totally excluded.    VENTRICLES:  midline and normal in size  POSTERIOR FOSSA:  The brain stem and cerebellum are unremarkable in appearance.  No CP angle abnormality is noted.  EXTRA-AXIAL:  No mass or collections are depicted.  VASCULATURE:  The major vessels and venous sinuses are grossly patent.  SINUSES AND MASTOIDS:  The coastal thickening is noted in the sinuses. Mastoids are clear.  MISCELLANEOUS:  A partial empty sella is incidentally noted    IMPRESSION:    1)  The MR study confirms an acute left posterior thalamic lacunar infarct, unchanged in size as compared to the prior CT.  2)  underlying white matter disease also noted which may reflect chronic ischemic change. However an inflammatory etiology and/or demyelinating disease  cannot be totally excluded. Further clinical correlation recommended.    --            MICK MIGUEL MD; Attending Radiologist  This document has been electronicallysigned. Oct 21 2021  9:49AM      ASSESSMENT AND PLAN:      seen for right sided weakness-  consistent with subacute left thalamic infarct  poorly controlled DM  HTN          RESUME ASA   CONTINUE STATIN  Physical therapy evaluation.  OOB to chair/ambulation with assistance only.  Pain is accessed and addressed.  Would continue to follow.

## 2021-10-22 NOTE — PROGRESS NOTE ADULT - PROBLEM SELECTOR PLAN 3
Creatinine 1.8 on admission, unknown baseline, this AM 1.7 , likely CKD 2 2/2 Uncontrolled HTN & DM 2   - Avoid diuretics and NSAIDS  - Continue to monitor renal indices. BMP in AM   Urine Analysis.  Renal Dr Isabel d/w  Renal sono-  Urinary retention Creatinine 1.8 on admission, unknown baseline, this AM 1.7 , likely CKD 2 2/2 Uncontrolled HTN & DM 2   - Avoid diuretics and NSAIDS  - Continue to monitor renal indices. BMP in AM   Urine Analysis.  Renal Dr Isabel d/w  Renal sono-  Urinary retention-pt voided well, Bladder scan -Neg last night  out pt follow up

## 2021-10-22 NOTE — PROGRESS NOTE ADULT - PROBLEM SELECTOR PLAN 4
- Pt was on metformin years back, currently not on home medications  - Found to have blood glucose of 286 on admission  - on low dose sliding scale  - F/u AM HbA1c  - F/u UA  - Carb consistent with DASH/TLC diet   - Dr. Perlman and diabetic education consulted- jennifer snyder on metformin, glimepride - Pt was on metformin years back, currently not on home medications  - Found to have blood glucose of 286 on admission  - on low dose sliding scale  - F/u AM HbA1c  - F/u UA  - Carb consistent with DASH/TLC diet   - Dr. Perlman and diabetic education consulted- will dc on metformin 500 mg 2x day , glimepiride 2 mg 2x day   out pt follow up with specialist

## 2021-10-22 NOTE — PROGRESS NOTE ADULT - SUBJECTIVE AND OBJECTIVE BOX
CAPILLARY BLOOD GLUCOSE      POCT Blood Glucose.: 283 mg/dL (21 Oct 2021 21:27)  POCT Blood Glucose.: 224 mg/dL (21 Oct 2021 16:47)  POCT Blood Glucose.: 244 mg/dL (21 Oct 2021 11:29)  POCT Blood Glucose.: 371 mg/dL (21 Oct 2021 07:29)      Vital Signs Last 24 Hrs  T(C): 36.4 (22 Oct 2021 04:36), Max: 36.9 (21 Oct 2021 11:33)  T(F): 97.5 (22 Oct 2021 04:36), Max: 98.4 (21 Oct 2021 11:33)  HR: 95 (22 Oct 2021 04:36) (95 - 110)  BP: 148/92 (22 Oct 2021 04:36) (148/92 - 166/96)  BP(mean): --  RR: 18 (22 Oct 2021 04:36) (17 - 18)  SpO2: 94% (22 Oct 2021 04:36) (94% - 97%)    General: WN/WD NAD  Respiratory: CTA B/L  CV: RRR, S1S2, no murmurs, rubs or gallops  Abdominal: Soft, NT, ND +BS, Last BM  Extremities: No edema, + peripheral pulses     10-22    138  |  104  |  21  ----------------------------<  207<H>  3.7   |  29  |  1.70<H>    Ca    9.0      22 Oct 2021 06:44    TPro  6.6  /  Alb  2.4<L>  /  TBili  0.5  /  DBili  x   /  AST  14<L>  /  ALT  16  /  AlkPhos  100  10-22      atorvastatin 80 milliGRAM(s) Oral at bedtime  dextrose 40% Gel 15 Gram(s) Oral once  dextrose 40% Gel 15 Gram(s) Oral once  dextrose 50% Injectable 25 Gram(s) IV Push once  dextrose 50% Injectable 12.5 Gram(s) IV Push once  dextrose 50% Injectable 25 Gram(s) IV Push once  glucagon  Injectable 1 milliGRAM(s) IntraMuscular once  insulin glargine Injectable (LANTUS) 10 Unit(s) SubCutaneous at bedtime  insulin lispro (ADMELOG) corrective regimen sliding scale   SubCutaneous three times a day before meals  insulin lispro (ADMELOG) corrective regimen sliding scale   SubCutaneous at bedtime  insulin lispro Injectable (ADMELOG) 3 Unit(s) SubCutaneous three times a day before meals

## 2021-10-22 NOTE — PROGRESS NOTE ADULT - SUBJECTIVE AND OBJECTIVE BOX
Patient is a 51y old  Male who presents with a chief complaint of Stroke (21 Oct 2021 14:11)    Type 2 diabetes, uncontrolled. Not currently taking any home medications. Will likely be discharged on insulin. Seen to educate on use of insulin pen.     HPI:  Patient is a 52 y/o M with PMH of HTN, DM, HLD, Bell's palsy(2017), presented to ED with right side numbness. Reports he has been feeling intermittent numbness since past 1 month, then yesterday he   woke up with right sided numbness yesterday, which was constant from head to toe, denies associated weakness, slurred speech difficulty swallowing, blurry vision, chest pain or trouble with gait.  Patient  states he thought it would go away but didn't get better. Reports he is not seen by any physician since 2019, at that time he was put on medication metformin and rest of the medication he didn't remember. He ran out of medication in April 2020 and has no medical insurance coverage since then. States numbness is still the same after getting hydralazine and Norvasc in ED. Patient is unvaccinated for COVID. Denies sick contacts or recent travel.     Of note, patient is reluctant on answering few questions. He might be with holding some information regarding his PMH. Patient admits his diet is mostly junk food, he adds raw salt/seasoning/ brooks on top of the meals. 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 Per patient, he was laid off from work years ago, lives alone. Doesn't follow any PCP. Patient is not taking any medications at home.    Vitals: T 98.2F, HR 97, /99, RR 16, SpO2 98  Significant labs: Cr 1.8, Glucose 286, H/H 11.7/34.3  Patient has been given aspirin [id number] , Hydralazine 10mg IVPx2 , Norvasc 5mgx1, atorvastatin 80 mg x1, famotidine 20mg x1  CT head noncontrast: 1)  there are lacunar infarcts in both thalami. A left thalamic lacune may be recent and/or subacute. This may be further assessed with MR imaging. The brain is otherwise unremarkable. 2)  the right sphenoid sinus is moderately opacified. Maxillary sinuses are incompletely imaged but there is thickening on the right.  EKG: sinus rhythm with premature atrial complexes.  (20 Oct 2021 20:48)      PAST MEDICAL & SURGICAL HISTORY:  Primary hypertension    H/O Bell&#x27;s palsy    Hyperlipemia    Type 2 diabetes mellitus        REVIEW OF SYSTEMS  General:	as above  Eye: no blurry vison  Respiratory: NAD, No SOB, no cough  Cardiovascular: No chest pain, no palpitations	  Gastrointestinal:	No nausea, vomiting, no abdominal pain  Endocrine: no polyuria, no polydipsia, or S/S of hypoglycemia  Neuro: denies numbess, no tingling	  Other:	      Allergies    No Known Drug Allergies  shellfish (Short breath)    Intolerances        MEDICATIONS  (STANDING):  amLODIPine   Tablet 5 milliGRAM(s) Oral daily  atorvastatin 80 milliGRAM(s) Oral at bedtime  dextrose 40% Gel 15 Gram(s) Oral once  dextrose 40% Gel 15 Gram(s) Oral once  dextrose 5%. 1000 milliLiter(s) (50 mL/Hr) IV Continuous <Continuous>  dextrose 5%. 1000 milliLiter(s) (50 mL/Hr) IV Continuous <Continuous>  dextrose 5%. 1000 milliLiter(s) (100 mL/Hr) IV Continuous <Continuous>  dextrose 50% Injectable 25 Gram(s) IV Push once  dextrose 50% Injectable 12.5 Gram(s) IV Push once  dextrose 50% Injectable 25 Gram(s) IV Push once  famotidine    Tablet 20 milliGRAM(s) Oral daily  glucagon  Injectable 1 milliGRAM(s) IntraMuscular once  heparin   Injectable 5000 Unit(s) SubCutaneous every 12 hours  insulin glargine Injectable (LANTUS) 15 Unit(s) SubCutaneous at bedtime  insulin lispro (ADMELOG) corrective regimen sliding scale   SubCutaneous three times a day before meals  insulin lispro (ADMELOG) corrective regimen sliding scale   SubCutaneous at bedtime  insulin lispro Injectable (ADMELOG) 5 Unit(s) SubCutaneous three times a day before meals

## 2021-10-22 NOTE — BH CONSULTATION LIAISON ASSESSMENT NOTE - HPI (INCLUDE ILLNESS QUALITY, SEVERITY, DURATION, TIMING, CONTEXT, MODIFYING FACTORS, ASSOCIATED SIGNS AND SYMPTOMS)
Patient seen, evaluated and chart reviewed. 52 y/o M with PMH of HTN, DM, HLD, Bell's palsy(2017), presented to ED with right side numbness. Reports he has been feeling intermittent numbness since past 1 month, then yesterday he   woke up with right sided numbness yesterday, which was constant from head to toe, denies associated weakness, slurred speech difficulty swallowing, blurry vision, chest pain or trouble with gait.  Patient  states he thought it would go away but didn't get better. Reports he is not seen by any physician since 2019, at that time he was put on medication metformin and rest of the medication he didn't remember. He ran out of medication in April 2020 and has no medical insurance coverage since then. States numbness is still the same after getting hydralazine and Norvasc in ED. Patient is unvaccinated for COVID. Denies sick contacts or recent travel. Patient is currently admitting to worsening depression in context of unemployment and difficulty dealing with his medical situation and lack of resources.

## 2021-10-22 NOTE — PROGRESS NOTE ADULT - SUBJECTIVE AND OBJECTIVE BOX
Patient is a 51y old  Male who presents with a chief complaint of 51y A1C with Estimated Average Glucose Result: 10.0 % (10-21-21 @ 11:36)   diabetes mellitus uncontrolled type 2 (22 Oct 2021 09:45)    HPI:  Patient is a 52 y/o M with PMH of HTN, DM, HLD, Bell's palsy(), presented to ED with right side numbness. Reports he has been feeling intermittent numbness since past 1 month, then yesterday he   woke up with right sided numbness yesterday, which was constant from head to toe, denies associated weakness, slurred speech difficulty swallowing, blurry vision, chest pain or trouble with gait.  Patient  states he thought it would go away but didn't get better. Reports he is not seen by any physician since , at that time he was put on medication metformin and rest of the medication he didn't remember. He ran out of medication in 2020 and has no medical insurance coverage since then. States numbness is still the same after getting hydralazine and Norvasc in ED. Patient is unvaccinated for COVID. Denies sick contacts or recent travel.     Of note, patient is reluctant on answering few questions. He might be with holding some information regarding his PMH. Patient admits his diet is mostly junk food, he adds raw salt/seasoning/ brooks on top of the meals. 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 Per patient, he was laid off from work years ago, lives alone. Doesn't follow any PCP. Patient is not taking any medications at home.    Vitals: T 98.2F, HR 97, /99, RR 16, SpO2 98  Significant labs: Cr 1.8, Glucose 286, H/H 11.7/34.3  Patient has been given aspirin [id number] , Hydralazine 10mg IVPx2 , Norvasc 5mgx1, atorvastatin 80 mg x1, famotidine 20mg x1  CT head noncontrast: 1)  there are lacunar infarcts in both thalami. A left thalamic lacune may be recent and/or subacute. This may be further assessed with MR imaging. The brain is otherwise unremarkable. 2)  the right sphenoid sinus is moderately opacified. Maxillary sinuses are incompletely imaged but there is thickening on the right.  EKG: sinus rhythm with premature atrial complexes.  (20 Oct 2021 20:48)    INTERVAL HPI:  10/21/21: Patient was seen and examined at bedside. MRI Brain done , ECHO, Carotid Doppler done,  feel better.  10/22/21: Patient seen and examined at bedside. Numbness right side body still present. DC planning in progress.      OVERNIGHT EVENTS: none    Home Medications:      MEDICATIONS  (STANDING):  amLODIPine   Tablet 5 milliGRAM(s) Oral daily  aspirin enteric coated 325 milliGRAM(s) Oral daily  atorvastatin 80 milliGRAM(s) Oral at bedtime  dextrose 40% Gel 15 Gram(s) Oral once  dextrose 40% Gel 15 Gram(s) Oral once  dextrose 5%. 1000 milliLiter(s) (50 mL/Hr) IV Continuous <Continuous>  dextrose 5%. 1000 milliLiter(s) (50 mL/Hr) IV Continuous <Continuous>  dextrose 5%. 1000 milliLiter(s) (100 mL/Hr) IV Continuous <Continuous>  dextrose 50% Injectable 25 Gram(s) IV Push once  dextrose 50% Injectable 12.5 Gram(s) IV Push once  dextrose 50% Injectable 25 Gram(s) IV Push once  famotidine    Tablet 20 milliGRAM(s) Oral daily  glucagon  Injectable 1 milliGRAM(s) IntraMuscular once  heparin   Injectable 5000 Unit(s) SubCutaneous every 12 hours  insulin glargine Injectable (LANTUS) 15 Unit(s) SubCutaneous at bedtime  insulin lispro (ADMELOG) corrective regimen sliding scale   SubCutaneous three times a day before meals  insulin lispro (ADMELOG) corrective regimen sliding scale   SubCutaneous at bedtime  insulin lispro Injectable (ADMELOG) 5 Unit(s) SubCutaneous three times a day before meals  metFORMIN 500 milliGRAM(s) Oral two times a day  metoprolol tartrate 25 milliGRAM(s) Oral two times a day  mirtazapine 7.5 milliGRAM(s) Oral at bedtime  PARoxetine 10 milliGRAM(s) Oral daily    MEDICATIONS  (PRN):      Allergies    No Known Drug Allergies  shellfish (Short breath)    Intolerances        Social History:  Tobacco:  denies  EtOH:  denies  Recreational drug use: denies  Lives with: alone   Ambulates: independently   Vaccinations:  Unvaccinated for COVID (20 Oct 2021 20:48)      REVIEW OF SYSTEMS:  No fever, No chills, No fatigue, No myalgia, No Body ache, No Weakness  EYES: No eye pain,  No visual disturbances, No discharge, NO Redness  ENMT:  No ear pain, No nose bleed, No vertigo; No sinus pain, NO throat pain, No Congestion  NECK: No pain, No stiffness  RESPIRATORY: No cough, NO wheezing, No  hemoptysis, NO  shortness of breath  CARDIOVASCULAR: No chest pain, palpitations  GASTROINTESTINAL: No abdominal pain, NO epigastric pain. No nausea, No vomiting; No diarrhea, No constipation. [  ] BM  GENITOURINARY: No dysuria, No frequency, No urgency, No hematuria, NO incontinence  NEUROLOGICAL: No headaches, No dizziness, No numbness, No tingling, No tremors, No weakness  EXT: No Swelling, No Pain, No Edema  SKIN:  [ x ] No itching, burning, rashes, or lesions   MUSCULOSKELETAL: No joint pain ,No Jt swelling; No muscle pain, No back pain, No extremity pain  PSYCHIATRIC: No depression,  No anxiety,  No mood swings ,No difficulty sleeping at night  PAIN SCALE: [ x ] None  [  ] Other-  ROS Unable to obtain due to - [  ] Dementia  [  ] Lethargy [  ] Drowsy [  ] Sedated [  ] non verbal  REST OF REVIEW Of SYSTEM - [ x ] Normal       Vital Signs Last 24 Hrs  T(C): 37.1 (22 Oct 2021 16:55), Max: 37.1 (22 Oct 2021 16:55)  T(F): 98.7 (22 Oct 2021 16:55), Max: 98.7 (22 Oct 2021 16:55)  HR: 86 (22 Oct 2021 16:55) (86 - 110)  BP: 153/99 (22 Oct 2021 16:55) (142/90 - 171/104)  BP(mean): --  RR: 18 (22 Oct 2021 16:55) (17 - 18)  SpO2: 98% (22 Oct 2021 16:55) (94% - 98%)  Finger Stick        10-21 @ 07:01  -  10-22 @ 07:00  --------------------------------------------------------  IN: 240 mL / OUT: 830 mL / NET: -590 mL    10-22 @ 07:01  -  10-22 @ 17:50  --------------------------------------------------------  IN: 0 mL / OUT: 600 mL / NET: -600 mL        PHYSICAL EXAM:  GENERAL:  [x  ] NAD , [ x ] well appearing, [  ] Agitated, [  ] Drowsy,  [  ] Lethargy, [  ] confused   HEAD:  [x  ] Normal, [  ] Other  EYES:  [ x ] EOMI, [ x ] PERRLA, [ x ] conjunctiva and sclera clear normal, [  ] Other,  [  ] Pallor,[  ] Discharge  ENMT:  [ x ] Normal, [ x ] Moist mucous membranes, [x  ] Good dentition, [ x ] No Thrush  NECK:  [ x ] Supple, [x  ] No JVD, [ x ] Normal thyroid, [  ] Lymphadenopathy [  ] Other  CHEST/LUNG:  [x  ] Clear to auscultation bilaterally, [x  ] Breath Sounds equal B/L / Decrease, [x  ] poor effort  [ x ] No rales, [x  ] No rhonchi  [ x ]  No wheezing,   HEART:  [ x ] Regular rate and rhythm, [  ] tachycardia, [  ] Bradycardia,  [  ] irregular  [  ] No murmurs, No rubs, No gallops, [  ] PPM in place (Mfr:  )  ABDOMEN:  [x  ] Soft, [ x ] Nontender, [ x ] Nondistended, [ x ] No mass, [ x ] Bowel sounds present, [x  ] obese  NERVOUS SYSTEM:  [  ] Alert & Oriented X3, [ x ] Nonfocal  [  ] Confusion  [  ] Encephalopathic [  ] Sedated [  ] Unable to assess, [  ] Dementia [  ] Other-  EXTREMITIES: [x  ] 2+ Peripheral Pulses, No clubbing, No cyanosis,  [  ] edema B/L lower EXT. [  ] PVD stasis skin changes B/L Lower EXT, [  ] wound  LYMPH: No lymphadenopathy noted  SKIN:  [ x ] No rashes or lesions, [  ] Pressure Ulcers, [  ] ecchymosis, [  ] Skin Tears, [  ] Other    DIET: Diet, Consistent Carbohydrate w/Evening Snack:   No Beef  No Pork (10-21-21 @ 10:18)      LABS:                        11.2   5.74  )-----------( 301      ( 22 Oct 2021 06:44 )             32.5     22 Oct 2021 06:44    138    |  104    |  21     ----------------------------<  207    3.7     |  29     |  1.70     Ca    9.0        22 Oct 2021 06:44    TPro  6.6    /  Alb  2.4    /  TBili  0.5    /  DBili  x      /  AST  14     /  ALT  16     /  AlkPhos  100    22 Oct 2021 06:44    PT/INR - ( 20 Oct 2021 18:56 )   PT: 11.9 sec;   INR: 1.02 ratio         PTT - ( 20 Oct 2021 18:56 )  PTT:28.6 sec  Urinalysis Basic - ( 22 Oct 2021 08:08 )    Color: Yellow / Appearance: Clear / S.015 / pH: x  Gluc: x / Ketone: Negative  / Bili: Negative / Urobili: Negative   Blood: x / Protein: 100 / Nitrite: Negative   Leuk Esterase: Negative / RBC: 3-5 /HPF / WBC 3-5   Sq Epi: x / Non Sq Epi: Occasional / Bacteria: Negative                CARDIAC MARKERS ( 20 Oct 2021 18:56 )  <.015 ng/mL / x     / x     / x     / <1.0 ng/mL             Anemia Panel:      Thyroid Panel:                RADIOLOGY & ADDITIONAL TESTS:      HEALTH ISSUES - PROBLEM Dx:  Stroke    BRANDO (acute kidney injury)    DVT prophylaxis    Type 2 diabetes mellitus    Hypertensive urgency            Consultant(s) Notes Reviewed:  [  ] YES     Care Discussed with [X] Consultants  [  ] Patient  [  ] Family [  ] HCP [  ]   [  ] Social Service  [  ] RN, [  ] Physical Therapy,[  ] Palliative care team  DVT PPX: [  ] Lovenox, [  ] S C Heparin, [  ] Coumadin, [  ] Xarelto, [  ] Eliquis, [  ] Pradaxa, [  ] IV Heparin drip, [  ] SCD [  ] Contraindication 2 to GI Bleed,[  ] Ambulation [  ] Contraindicated 2 to  bleed [  ] Contraindicated 2 to Brain Bleed  Advanced directive: [  ] None, [  ] DNR/DNI Patient is a 51y old  Male who presents with a chief complaint of 51y A1C with Estimated Average Glucose Result: 10.0 % (10-21-21 @ 11:36)   diabetes mellitus uncontrolled type 2 (22 Oct 2021 09:45)    HPI:  Patient is a 50 y/o M with PMH of HTN, DM, HLD, Bell's palsy(), presented to ED with right side numbness. Reports he has been feeling intermittent numbness since past 1 month, then yesterday he   woke up with right sided numbness yesterday, which was constant from head to toe, denies associated weakness, slurred speech difficulty swallowing, blurry vision, chest pain or trouble with gait.  Patient  states he thought it would go away but didn't get better. Reports he is not seen by any physician since , at that time he was put on medication metformin and rest of the medication he didn't remember. He ran out of medication in 2020 and has no medical insurance coverage since then. States numbness is still the same after getting hydralazine and Norvasc in ED. Patient is unvaccinated for COVID. Denies sick contacts or recent travel.     Of note, patient is reluctant on answering few questions. He might be with holding some information regarding his PMH. Patient admits his diet is mostly junk food, he adds raw salt/seasoning/ brooks on top of the meals. 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 Per patient, he was laid off from work years ago, lives alone. Doesn't follow any PCP. Patient is not taking any medications at home.    Vitals: T 98.2F, HR 97, /99, RR 16, SpO2 98  Significant labs: Cr 1.8, Glucose 286, H/H 11.7/34.3  Patient has been given aspirin [id number] , Hydralazine 10mg IVPx2 , Norvasc 5mgx1, atorvastatin 80 mg x1, famotidine 20mg x1  CT head noncontrast: 1)  there are lacunar infarcts in both thalami. A left thalamic lacune may be recent and/or subacute. This may be further assessed with MR imaging. The brain is otherwise unremarkable. 2)  the right sphenoid sinus is moderately opacified. Maxillary sinuses are incompletely imaged but there is thickening on the right.  EKG: sinus rhythm with premature atrial complexes.  (20 Oct 2021 20:48)    INTERVAL HPI:  10/21/21: Patient was seen and examined at bedside. MRI Brain done , ECHO, Carotid Doppler done,  feel better.  10/22/21: Patient seen and examined at bedside. Numbness right side body still present. DC planning in progress. Meds d/w all consultants & DM NP.      OVERNIGHT EVENTS: none    Home Medications:      MEDICATIONS  (STANDING):  amLODIPine   Tablet 5 milliGRAM(s) Oral daily  aspirin enteric coated 325 milliGRAM(s) Oral daily  atorvastatin 80 milliGRAM(s) Oral at bedtime  dextrose 40% Gel 15 Gram(s) Oral once  dextrose 40% Gel 15 Gram(s) Oral once  dextrose 5%. 1000 milliLiter(s) (50 mL/Hr) IV Continuous <Continuous>  dextrose 5%. 1000 milliLiter(s) (50 mL/Hr) IV Continuous <Continuous>  dextrose 5%. 1000 milliLiter(s) (100 mL/Hr) IV Continuous <Continuous>  dextrose 50% Injectable 25 Gram(s) IV Push once  dextrose 50% Injectable 12.5 Gram(s) IV Push once  dextrose 50% Injectable 25 Gram(s) IV Push once  famotidine    Tablet 20 milliGRAM(s) Oral daily  glucagon  Injectable 1 milliGRAM(s) IntraMuscular once  heparin   Injectable 5000 Unit(s) SubCutaneous every 12 hours  insulin glargine Injectable (LANTUS) 15 Unit(s) SubCutaneous at bedtime  insulin lispro (ADMELOG) corrective regimen sliding scale   SubCutaneous three times a day before meals  insulin lispro (ADMELOG) corrective regimen sliding scale   SubCutaneous at bedtime  insulin lispro Injectable (ADMELOG) 5 Unit(s) SubCutaneous three times a day before meals  metFORMIN 500 milliGRAM(s) Oral two times a day  metoprolol tartrate 25 milliGRAM(s) Oral two times a day  mirtazapine 7.5 milliGRAM(s) Oral at bedtime  PARoxetine 10 milliGRAM(s) Oral daily    MEDICATIONS  (PRN):      Allergies    No Known Drug Allergies  shellfish (Short breath)    Intolerances        Social History:  Tobacco:  denies  EtOH:  denies  Recreational drug use: denies  Lives with: alone   Ambulates: independently   Vaccinations:  Unvaccinated for COVID (20 Oct 2021 20:48)      REVIEW OF SYSTEMS: i am better, Thank you for everything   No fever, No chills, No fatigue, No myalgia, No Body ache, No Weakness  EYES: No eye pain,  No visual disturbances, No discharge, NO Redness  ENMT:  No ear pain, No nose bleed, No vertigo; No sinus pain, NO throat pain, No Congestion  NECK: No pain, No stiffness  RESPIRATORY: No cough, NO wheezing, No  hemoptysis, NO  shortness of breath  CARDIOVASCULAR: No chest pain, palpitations  GASTROINTESTINAL: No abdominal pain, NO epigastric pain. No nausea, No vomiting; No diarrhea, No constipation. [  ] BM  GENITOURINARY: No dysuria, No frequency, No urgency, No hematuria, NO incontinence  NEUROLOGICAL: No headaches, No dizziness, No numbness, No tingling, No tremors, No weakness  EXT: No Swelling, No Pain, No Edema  SKIN:  [ x ] No itching, burning, rashes, or lesions   MUSCULOSKELETAL: No joint pain ,No Jt swelling; No muscle pain, No back pain, No extremity pain  PSYCHIATRIC: No depression,  No anxiety,  No mood swings ,No difficulty sleeping at night  PAIN SCALE: [ x ] None  [  ] Other-  ROS Unable to obtain due to - [  ] Dementia  [  ] Lethargy [  ] Drowsy [  ] Sedated [  ] non verbal  REST OF REVIEW Of SYSTEM - [ x ] Normal       Vital Signs Last 24 Hrs  T(C): 37.1 (22 Oct 2021 16:55), Max: 37.1 (22 Oct 2021 16:55)  T(F): 98.7 (22 Oct 2021 16:55), Max: 98.7 (22 Oct 2021 16:55)  HR: 86 (22 Oct 2021 16:55) (86 - 110)  BP: 153/99 (22 Oct 2021 16:55) (142/90 - 171/104)  BP(mean): --  RR: 18 (22 Oct 2021 16:55) (17 - 18)  SpO2: 98% (22 Oct 2021 16:55) (94% - 98%)  Finger Stick        10-21 @ 07:01  -  10-22 @ 07:00  --------------------------------------------------------  IN: 240 mL / OUT: 830 mL / NET: -590 mL    10-22 @ 07:01  -  10-22 @ 17:50  --------------------------------------------------------  IN: 0 mL / OUT: 600 mL / NET: -600 mL        PHYSICAL EXAM:  GENERAL:  [x  ] NAD , [ x ] well appearing, [  ] Agitated, [  ] Drowsy,  [  ] Lethargy, [  ] confused   HEAD:  [x  ] Normal, [  ] Other  EYES:  [ x ] EOMI, [ x ] PERRLA, [ x ] conjunctiva and sclera clear normal, [  ] Other,  [  ] Pallor,[  ] Discharge  ENMT:  [ x ] Normal, [ x ] Moist mucous membranes, [x  ] Good dentition, [ x ] No Thrush  NECK:  [ x ] Supple, [x  ] No JVD, [ x ] Normal thyroid, [  ] Lymphadenopathy [  ] Other  CHEST/LUNG:  [x  ] Clear to auscultation bilaterally, [x  ] Breath Sounds equal B/L / Decrease, [x  ] poor effort  [ x ] No rales, [x  ] No rhonchi  [ x ]  No wheezing,   HEART:  [ x ] Regular rate and rhythm, [  ] tachycardia, [  ] Bradycardia,  [  ] irregular  [  ] No murmurs, No rubs, No gallops, [  ] PPM in place (Mfr:  )  ABDOMEN:  [x  ] Soft, [ x ] Nontender, [ x ] Nondistended, [ x ] No mass, [ x ] Bowel sounds present, [x  ] obese  NERVOUS SYSTEM:  [  ] Alert & Oriented X3, [ x ] Nonfocal  [  ] Confusion  [  ] Encephalopathic [  ] Sedated [  ] Unable to assess, [  ] Dementia [  ] Other-  EXTREMITIES: [x  ] 2+ Peripheral Pulses, No clubbing, No cyanosis,  [  ] edema B/L lower EXT. [  ] PVD stasis skin changes B/L Lower EXT, [  ] wound  LYMPH: No lymphadenopathy noted  SKIN:  [ x ] No rashes or lesions, [  ] Pressure Ulcers, [  ] ecchymosis, [  ] Skin Tears, [  ] Other    DIET: Diet, Consistent Carbohydrate w/Evening Snack:   No Beef  No Pork (10-21-21 @ 10:18)      LABS:                        11.2   5.74  )-----------( 301      ( 22 Oct 2021 06:44 )             32.5     22 Oct 2021 06:44    138    |  104    |  21     ----------------------------<  207    3.7     |  29     |  1.70     Ca    9.0        22 Oct 2021 06:44    TPro  6.6    /  Alb  2.4    /  TBili  0.5    /  DBili  x      /  AST  14     /  ALT  16     /  AlkPhos  100    22 Oct 2021 06:44    PT/INR - ( 20 Oct 2021 18:56 )   PT: 11.9 sec;   INR: 1.02 ratio         PTT - ( 20 Oct 2021 18:56 )  PTT:28.6 sec  Urinalysis Basic - ( 22 Oct 2021 08:08 )    Color: Yellow / Appearance: Clear / S.015 / pH: x  Gluc: x / Ketone: Negative  / Bili: Negative / Urobili: Negative   Blood: x / Protein: 100 / Nitrite: Negative   Leuk Esterase: Negative / RBC: 3-5 /HPF / WBC 3-5   Sq Epi: x / Non Sq Epi: Occasional / Bacteria: Negative          CARDIAC MARKERS ( 20 Oct 2021 18:56 )  <.015 ng/mL / x     / x     / x     / <1.0 ng/mL        RADIOLOGY & ADDITIONAL TESTS:      HEALTH ISSUES - PROBLEM Dx:  Stroke    BRANDO (acute kidney injury)    DVT prophylaxis    Type 2 diabetes mellitus    Hypertensive urgency      Consultant(s) Notes Reviewed:  [x  ] YES     Care Discussed with [X] Consultants  [ x ] Patient  [  ] Family [  ] HCP [x  ]   [ x ] Social Service  [ x ] RN, [  ] Physical Therapy,[  ] Palliative care team  DVT PPX: [  ] Lovenox, [ x ] S C Heparin, [  ] Coumadin, [  ] Xarelto, [  ] Eliquis, [  ] Pradaxa, [  ] IV Heparin drip, [  ] SCD [  ] Contraindication 2 to GI Bleed,[  ] Ambulation [  ] Contraindicated 2 to  bleed [  ] Contraindicated 2 to Brain Bleed  Advanced directive: [ x ] None, [  ] DNR/DNI

## 2021-10-22 NOTE — PROGRESS NOTE ADULT - ASSESSMENT
52 y/o M with PMH of HTN, DM, HLD, Bell's palsy(2017), presented to ED with right side numbness. Patient admitted with CVA and HTN urgency. Cardiology consulted.    CVA  - Likely 2/2 uncontrolled HTN. Patient states he hasn't taken his meds in 1 year due to insurance issues. Confirmed on CT head  - MRI: confirms an acute left posterior thalamic lacunar infarct, unchanged in size as compared to the prior CT.  - EKG NSR with nonspecific ST wave abnormalities, no previous EKG on EMR  - S/p aspirin 325mg, continue daily  - Continue high dose statin    - F/u TTE    - carotid doppler: no significant stenosis    HTN  - SBP now improving 145, however found to be 194 on admission   - Allow permissive HTN for first 48 hours  - Previously on BP med however doesn't recall the name   - Continue Norvasc 5mg po qd   - Will start BB for elevated BP and tachycardia HR 80-110s when cleared by neuro  - DASH diet  - Monitor routine hemodynamics     Type 2 DM  - Previously on metformin however hasn't taken meds in 1 year  - Consistent carb diet   - Continue low dose correctional insulin scale  however likely to need additional insulin given uncontrolled diabetes for extended period of time and elevated BG levels this morning  - F/u HgbA1c  - Goal blood glucose in hospital setting 100-180, adjust insulin regimen PRN, can consider endo consult in uncontrolled   - Hypoglycemia protocol in place, monitor for signs of hypoglycemia   - elevated CR. Please continue to maintain strict I/Os, monitor daily weights, Cr, and K.       - Monitor and replete lytes, keep K>4, Mg>2.  - All other medical needs as per primary team.  - Other cardiovascular workup will depend on clinical course.  - Will continue to follow.    Jaycee Bass, MS ANP, AGACNP  Nurse Practitioner- Cardiology   Spectra #[phone]      52 y/o M with PMH of HTN, DM, HLD, Bell's palsy(2017), presented to ED with right side numbness. Patient admitted with CVA and HTN urgency. Cardiology consulted.    CVA  - Likely 2/2 uncontrolled HTN. Patient states he hasn't taken his meds in 1 year due to insurance issues. Confirmed on CT head  - MRI: confirms an acute left posterior thalamic lacunar infarct, unchanged in size as compared to the prior CT.  - EKG NSR with nonspecific ST wave abnormalities, no previous EKG on EMR  - S/p aspirin 325mg, continue daily  - Continue high dose statin    - F/u TTE    - carotid doppler: no significant stenosis    HTN  - SBP now improving 145, however found to be 194 on admission   - Allow permissive HTN for first 48 hours  - Previously on BP med however doesn't recall the name   - Continue Norvasc 5mg po qd   - Will start BB for elevated BP and tachycardia HR 80-110s when cleared by neuro  - DASH diet  - Monitor routine hemodynamics     Type 2 DM  - Previously on metformin however hasn't taken meds in 1 year  - Consistent carb diet   - Continue low dose correctional insulin scale  however likely to need additional insulin given uncontrolled diabetes for extended period of time and elevated BG levels this morning  - F/u HgbA1c  - Goal blood glucose in hospital setting 100-180, adjust insulin regimen PRN, can consider endo consult in uncontrolled   - Hypoglycemia protocol in place, monitor for signs of hypoglycemia   - elevated CR. Please continue to maintain strict I/Os, monitor daily weights, Cr, and K.     - Monitor and replete lytes, keep K>4, Mg>2.  - All other medical needs as per primary team.  - Other cardiovascular workup will depend on clinical course.  - Will continue to follow.    Jaycee Bass, MS ANP, AGACNP  Nurse Practitioner- Cardiology   Spectra #[phone]

## 2021-10-22 NOTE — PROGRESS NOTE ADULT - ASSESSMENT
Patient is a 52 y/o M with PMH of HTN, DM, HLD, Bell's palsy(2017), presented to ED with right side numbness. Reports he has been feeling intermittent numbness since past 1 month, then yesterday he woke up with right sided numbness yesterday, which was constant from head to toe, denies associated weakness, slurred speech difficulty swallowing, blurry vision, chest pain or trouble with gait. Admit for stroke work up.

## 2021-10-22 NOTE — BH CONSULTATION LIAISON ASSESSMENT NOTE - CURRENT MEDICATION
MEDICATIONS  (STANDING):  amLODIPine   Tablet 5 milliGRAM(s) Oral daily  aspirin enteric coated 325 milliGRAM(s) Oral daily  atorvastatin 80 milliGRAM(s) Oral at bedtime  dextrose 40% Gel 15 Gram(s) Oral once  dextrose 40% Gel 15 Gram(s) Oral once  dextrose 5%. 1000 milliLiter(s) (50 mL/Hr) IV Continuous <Continuous>  dextrose 5%. 1000 milliLiter(s) (50 mL/Hr) IV Continuous <Continuous>  dextrose 5%. 1000 milliLiter(s) (100 mL/Hr) IV Continuous <Continuous>  dextrose 50% Injectable 25 Gram(s) IV Push once  dextrose 50% Injectable 12.5 Gram(s) IV Push once  dextrose 50% Injectable 25 Gram(s) IV Push once  famotidine    Tablet 20 milliGRAM(s) Oral daily  glucagon  Injectable 1 milliGRAM(s) IntraMuscular once  heparin   Injectable 5000 Unit(s) SubCutaneous every 12 hours  insulin glargine Injectable (LANTUS) 15 Unit(s) SubCutaneous at bedtime  insulin lispro (ADMELOG) corrective regimen sliding scale   SubCutaneous three times a day before meals  insulin lispro (ADMELOG) corrective regimen sliding scale   SubCutaneous at bedtime  insulin lispro Injectable (ADMELOG) 5 Unit(s) SubCutaneous three times a day before meals    MEDICATIONS  (PRN):

## 2021-10-22 NOTE — PROGRESS NOTE ADULT - PROBLEM SELECTOR PLAN 1
Patient presenting with right sided numbness, with no other neurological symptoms. Found to have lacunar infarct on CT in ED  - Admit to F.  - cont aspirin 325mg  - CT head noncontrast: lacunar infarcts in both thalami. A left thalamic lacune may be recent and/or subacute.  - F/u TTE, carotid dopplers  - MRA- No changes of underlying vasculopathy or stenosis.  - MRI head-  1)  The MR study confirms an acute left posterior thalamic lacunar infarct, unchanged in size as compared to the prior CT.  2)  underlying white matter disease also noted which may reflect chronic ischemic change. However an inflammatory etiology and/or demyelinating disease cannot be totally excluded. Further clinical correlation recommended.  - Q4 Neuro checks  - Goal BP: SBP <150, SBP >130   - cont asa 325 mg  daily, statin daily, Pepcid daily   - PT and OT consultation; independent with full weight bearing   - speech and swallow- regular diet with thin liquids   - A1c 10, lipids elevated  -neurology, Dr. Johnson, f/u recs  -Avoid hypotension 2/2 Ac CVA  - Cardio consulted, f/u recs Patient presenting with right sided numbness, with no other neurological symptoms. Found to have lacunar infarct on CT in ED  - Admit to F.  - cont aspirin 325mg  - CT head noncontrast: lacunar infarcts in both thalami. A left thalamic lacune may be recent and/or subacute.  - F/u TTE, carotid dopplers  - MRA- No changes of underlying vasculopathy or stenosis.  - MRI head-  1)  The MR study confirms an acute left posterior thalamic lacunar infarct, unchanged in size as compared to the prior CT.  2)  underlying white matter disease also noted which may reflect chronic ischemic change. However an inflammatory etiology and/or demyelinating disease cannot be totally excluded. Further clinical correlation recommended.  - Q4 Neuro checks  - Goal BP: SBP <150, SBP >130   - cont asa 325 mg  daily, statin daily, Pepcid daily   - PT and OT consultation; independent with full weight bearing   - speech and swallow- regular diet with thin liquids   - A1c 10, lipids elevated  -neurology, Dr. Johnson, f/u recs-ASA, statin,   -Avoid hypotension 2/2 Ac CVA  - Cardio consulted, f/u recs BP control

## 2021-10-22 NOTE — PROGRESS NOTE ADULT - SUBJECTIVE AND OBJECTIVE BOX
Patient is a 51y old  Male who presents with a chief complaint of 51y A1C with Estimated Average Glucose Result: 10.0 % (10-21-21 @ 11:36)   diabetes mellitus uncontrolled type 2 (22 Oct 2021 09:45)    Patient seen in follow up for CKD 3.        PAST MEDICAL HISTORY:  HTN (hypertension)    Primary hypertension    Bell&#x27;s palsy    H/O Bell&#x27;s palsy    Hyperlipemia    Diabetes mellitus    Type 2 diabetes mellitus      MEDICATIONS  (STANDING):  amLODIPine   Tablet 5 milliGRAM(s) Oral daily  aspirin enteric coated 325 milliGRAM(s) Oral daily  atorvastatin 80 milliGRAM(s) Oral at bedtime  dextrose 40% Gel 15 Gram(s) Oral once  dextrose 40% Gel 15 Gram(s) Oral once  dextrose 5%. 1000 milliLiter(s) (50 mL/Hr) IV Continuous <Continuous>  dextrose 5%. 1000 milliLiter(s) (50 mL/Hr) IV Continuous <Continuous>  dextrose 5%. 1000 milliLiter(s) (100 mL/Hr) IV Continuous <Continuous>  dextrose 50% Injectable 25 Gram(s) IV Push once  dextrose 50% Injectable 12.5 Gram(s) IV Push once  dextrose 50% Injectable 25 Gram(s) IV Push once  famotidine    Tablet 20 milliGRAM(s) Oral daily  glucagon  Injectable 1 milliGRAM(s) IntraMuscular once  heparin   Injectable 5000 Unit(s) SubCutaneous every 12 hours  insulin glargine Injectable (LANTUS) 15 Unit(s) SubCutaneous at bedtime  insulin lispro (ADMELOG) corrective regimen sliding scale   SubCutaneous three times a day before meals  insulin lispro (ADMELOG) corrective regimen sliding scale   SubCutaneous at bedtime  insulin lispro Injectable (ADMELOG) 5 Unit(s) SubCutaneous three times a day before meals  metFORMIN 500 milliGRAM(s) Oral two times a day  metoprolol tartrate 25 milliGRAM(s) Oral two times a day  mirtazapine 7.5 milliGRAM(s) Oral at bedtime  PARoxetine 10 milliGRAM(s) Oral daily    MEDICATIONS  (PRN):    T(C): 36.8 (10-22-21 @ 12:30), Max: 36.9 (10-21-21 @ 11:33)  HR: 104 (10-22-21 @ 12:30) (89 - 110)  BP: 171/104 (10-22-21 @ 14:13) (142/90 - 171/104)  RR: 17 (10-22-21 @ 12:30) (17 - 18)  SpO2: 95% (10-22-21 @ 12:30) (94% - 97%)  Wt(kg): --  I&O's Detail    21 Oct 2021 07:01  -  22 Oct 2021 07:00  --------------------------------------------------------  IN:    Oral Fluid: 240 mL  Total IN: 240 mL    OUT:    Voided (mL): 830 mL  Total OUT: 830 mL    Total NET: -590 mL      22 Oct 2021 07:01  -  22 Oct 2021 15:19  --------------------------------------------------------  IN:  Total IN: 0 mL    OUT:    Voided (mL): 300 mL  Total OUT: 300 mL    Total NET: -300 mL          PHYSICAL EXAM:  General: No distress  Respiratory: b/l air entry  Cardiovascular: S1 S2  Gastrointestinal: soft  Extremities:  edema                              11.2   5.74  )-----------( 301      ( 22 Oct 2021 06:44 )             32.5     10-22    138  |  104  |  21  ----------------------------<  207<H>  3.7   |  29  |  1.70<H>    Ca    9.0      22 Oct 2021 06:44    TPro  6.6  /  Alb  2.4<L>  /  TBili  0.5  /  DBili  x   /  AST  14<L>  /  ALT  16  /  AlkPhos  100  10-22    CARDIAC MARKERS ( 20 Oct 2021 18:56 )  <.015 ng/mL / x     / x     / x     / <1.0 ng/mL      LIVER FUNCTIONS - ( 22 Oct 2021 06:44 )  Alb: 2.4 g/dL / Pro: 6.6 g/dL / ALK PHOS: 100 U/L / ALT: 16 U/L / AST: 14 U/L / GGT: x           Urinalysis Basic - ( 22 Oct 2021 08:08 )    Color: Yellow / Appearance: Clear / S.015 / pH: x  Gluc: x / Ketone: Negative  / Bili: Negative / Urobili: Negative   Blood: x / Protein: 100 / Nitrite: Negative   Leuk Esterase: Negative / RBC: 3-5 /HPF / WBC 3-5   Sq Epi: x / Non Sq Epi: Occasional / Bacteria: Negative        Sodium, Serum: 138 (10-22 @ 06:44)  Sodium, Serum: 138 (10-21 @ 07:46)  Sodium, Serum: 138 (10-20 @ 18:56)    Creatinine, Serum: 1.70 (10-22 @ 06:44)  Creatinine, Serum: 1.70 (10-21 @ 07:46)  Creatinine, Serum: 1.80 (10-20 @ 18:56)    Potassium, Serum: 3.7 (10-22 @ 06:44)  Potassium, Serum: 4.0 (10-21 @ 07:46)  Potassium, Serum: 4.4 (10-20 @ 18:56)    Hemoglobin: 11.2 (10-22 @ 06:44)  Hemoglobin: 12.4 (10-21 @ 07:46)  Hemoglobin: 11.7 (10-20 @ 18:56)      < from: US Kidney and Bladder (10.21.21 @ 16:31) >    EXAM:  US KIDNEYS AND BLADDER                            PROCEDURE DATE:  10/21/2021          INTERPRETATION:  CLINICAL INFORMATION: Acute kidney injury. Evaluate for urinary retention.    COMPARISON: None available.    TECHNIQUE: Sonography of thekidneys and bladder.    FINDINGS:    Right kidney: 11.2 cm. No renal mass, hydronephrosis or calculi.    Left kidney: 9.6 cm. No renal mass, hydronephrosis or calculi.    Urinary bladder: The bladder is distended at the time of examination (calculated volume = 853 mL). The patient reports he does not have the urge to void at the time of exam.    IMPRESSION:    Sonographically normal kidneys. No hydronephrosis.    Large bladder volume of 853 mL. The patient declined to void at the time of exam.      --- End of Report ---            IRMA ALTMAN MD; Attending Radiologist  This document has been electronically signed. Oct 21 2021  4:51PM    < end of copied text >

## 2021-10-22 NOTE — PROGRESS NOTE ADULT - PROBLEM SELECTOR PLAN 1
increase lantus 15 units qhs  increase admelog 5 units 3x/day before meals  cont low dose admelog correctional scale coverage qac/qhs  cont cons cho diet  goal bg 100-180 in hosp setting

## 2021-10-22 NOTE — PROGRESS NOTE ADULT - ATTENDING COMMENTS
He likely has a acute CVA secondary to hypertensive emergency.  He has not been taking any medication for over a year.  Has not seen any doctor since 2019.  He currently has no insurance.   Has had a good response to Norvasc 5 mg daily.  Will allow for permissive hypertension for now.  Can follow-up with neuro recommendations on when to lower his blood pressure more.  No signs of significant ischemia or volume overload.  EKG with nonspecific anterolateral TW changes. trops neg. outpt ischemic evaluation.   Check echocardiogram.  Monitor on telemetry for occult atrial fibrillation.  Further cardiac work-up will depend on clinical course.
Patient is a 52 y/o M with PMH of HTN, DM, HLD, Bell's palsy(2017), presented to ED with right side numbness. Reports he has been feeling intermittent numbness since past 1 month, then yesterday he woke up with right sided numbness yesterday, which was constant from head to toe, denies associated weakness, slurred speech difficulty swallowing, blurry vision, chest pain or trouble with gait. Admit for stroke work up.  Pt seen, examined, case & care plan d/w pt, residents at detail.  AM labs   PO diet, Nutritional eval done   DR Johnson -Neurology d/w   PT/OT eval done   Diabetic NP eval done , d/w pt at detail.    NO PT Need, S & S eval-Regular diet OK   Total care time is 45 minutes.
Patient is a 52 y/o M with PMH of HTN, DM, HLD, Bell's palsy(2017), presented to ED with right side numbness. Reports he has been feeling intermittent numbness since past 1 month, then yesterday he woke up with right sided numbness yesterday, which was constant from head to toe, denies associated weakness, slurred speech difficulty swallowing, blurry vision, chest pain or trouble with gait. Admit for stroke work up.  Pt seen, examined, case & care plan d/w pt, residents at detail.  AM labs   PO diet, Nutritional eval done   DR Johnson -Neurology d/w stable for d/c home   -Cardiology-Dr López- Lopressor 25 mg 1 tab 2x day   PT/OT eval done -No Need  Diabetic NP eval done , start [id number] day start Glimepiride 2x day on d/c   d/w pt at detail.    NO PT Need, S & S eval-Regular diet OK   -[facility]O -meds to beds arranged, pt unable to Pay money, scripts sent to [facility] pharmacy.  D/C Home today   -Total care time is 60 minutes. I have d/w pt at detail about compliance with meds, Diet &  Follow up with MD's , to start out pt care with clinic, info given.pt voiced full verbal understanding & responsibility for his health.

## 2021-10-22 NOTE — PROGRESS NOTE ADULT - PROBLEM SELECTOR PLAN 5
- Heparin 5000U subcutaneous q12.
- Heparin 5000U subcutaneous q12.    Depression: psych Dr. stewart consulted, started on mirtazipine and paroxetine.

## 2021-10-22 NOTE — PROGRESS NOTE ADULT - PROBLEM SELECTOR PLAN 1
Type 2 A1c 10 % s/p CVA  Recommend endocrine-Perlman on consult  FU appt: TBA-will go to area clinic since he does not have insurance  DSC recommendations: will likely need to start basal insulin with oral meds and/or bolus insulin with meals.   diabetes education provided-insulin pen teaching done with patient today. Instructed on types of insulin, how to self inject and dispose of pen needles. Reviewed hypoglycemia signs and symptoms and how to treat.   Patient was able to return demonstrate with good understanding.   Diabetes support group info and cell # [phone] given   Goal 100-180 mg/dL; 140-180 mg/dL in critical care areas

## 2021-10-22 NOTE — PROGRESS NOTE ADULT - SUBJECTIVE AND OBJECTIVE BOX
[facility] -- Glen Sarmiento Gross[place], [place], Nikolas Alvarado [place], [place]: Office # [phone]    Follow Up:  CVA HTN    Subjective/Observations: Patient seen and examined. Patient awake, alert, resting comfortably in bed.  No complaints of chest pain, dyspnea, palpitations or dizziness. Reports continued right sided numbness but denies weakness. Tolerating room air.     REVIEW OF SYSTEMS: All review of systems is negative for eye, ENT, GI, , allergic, dermatologic, musculoskeletal and neurologic except as described above    PAST MEDICAL & SURGICAL HISTORY:  Primary hypertension    H/O Bell&#x27;s palsy    Hyperlipemia    Type 2 diabetes mellitus        MEDICATIONS  (STANDING):  amLODIPine   Tablet 5 milliGRAM(s) Oral daily  atorvastatin 80 milliGRAM(s) Oral at bedtime  dextrose 40% Gel 15 Gram(s) Oral once  dextrose 40% Gel 15 Gram(s) Oral once  dextrose 5%. 1000 milliLiter(s) (50 mL/Hr) IV Continuous <Continuous>  dextrose 5%. 1000 milliLiter(s) (50 mL/Hr) IV Continuous <Continuous>  dextrose 5%. 1000 milliLiter(s) (100 mL/Hr) IV Continuous <Continuous>  dextrose 50% Injectable 25 Gram(s) IV Push once  dextrose 50% Injectable 12.5 Gram(s) IV Push once  dextrose 50% Injectable 25 Gram(s) IV Push once  famotidine    Tablet 20 milliGRAM(s) Oral daily  glucagon  Injectable 1 milliGRAM(s) IntraMuscular once  heparin   Injectable 5000 Unit(s) SubCutaneous every 12 hours  insulin glargine Injectable (LANTUS) 15 Unit(s) SubCutaneous at bedtime  insulin lispro (ADMELOG) corrective regimen sliding scale   SubCutaneous three times a day before meals  insulin lispro (ADMELOG) corrective regimen sliding scale   SubCutaneous at bedtime  insulin lispro Injectable (ADMELOG) 5 Unit(s) SubCutaneous three times a day before meals    MEDICATIONS  (PRN):    Allergies    No Known Drug Allergies  shellfish (Short breath)    Intolerances      Vital Signs Last 24 Hrs  T(C): 36.4 (22 Oct 2021 04:36), Max: 36.9 (21 Oct 2021 11:33)  T(F): 97.5 (22 Oct 2021 04:36), Max: 98.4 (21 Oct 2021 11:33)  HR: 95 (22 Oct 2021 04:36) (95 - 110)  BP: 148/92 (22 Oct 2021 04:36) (148/92 - 160/97)  BP(mean): --  RR: 18 (22 Oct 2021 04:36) (17 - 18)  SpO2: 94% (22 Oct 2021 04:36) (94% - 97%)  I&O's Summary    21 Oct 2021 07:01  -  22 Oct 2021 07:00  --------------------------------------------------------  IN: 240 mL / OUT: 830 mL / NET: -590 mL          TELE:    PHYSICAL EXAM:  Appearance: NAD, no distress, alert, Well developed   HEENT: Moist Mucous Membranes, Anicteric  Cardiovascular: Regular rate and rhythm, Normal S1 S2, No JVD, No murmurs, No rubs, gallops or clicks  Respiratory: Non-labored, Clear to auscultation, No rales, No rhonchi, No wheezing.   Gastrointestinal:  Soft, Non-tender, + BS  Neurologic: Non-focal  Skin: Warm and dry, No visible rashes or ulcers, No ecchymosis, No cyanosis  Musculoskeletal: No clubbing, No cyanosis, No joint swelling/tenderness  Psychiatry: Mood & affect appropriate  Lymph: No peripheral edema.     LABS: All Labs Reviewed:                        11.2   5.74  )-----------( 301      ( 22 Oct 2021 06:44 )             32.5                         12.4   5.62  )-----------( 326      ( 21 Oct 2021 07:46 )             35.0                         11.7   5.77  )-----------( 329      ( 20 Oct 2021 18:56 )             34.3     22 Oct 2021 06:44    138    |  104    |  21     ----------------------------<  207    3.7     |  29     |  1.70   21 Oct 2021 07:46    138    |  103    |  21     ----------------------------<  368    4.0     |  29     |  1.70   20 Oct 2021 18:56    138    |  103    |  22     ----------------------------<  286    4.4     |  30     |  1.80     Ca    9.0        22 Oct 2021 06:44  Ca    9.2        21 Oct 2021 07:46  Ca    9.3        20 Oct 2021 18:56    TPro  6.6    /  Alb  2.4    /  TBili  0.5    /  DBili  x      /  AST  14     /  ALT  16     /  AlkPhos  100    22 Oct 2021 06:44  TPro  7.7    /  Alb  2.8    /  TBili  0.5    /  DBili  x      /  AST  11     /  ALT  15     /  AlkPhos  119    21 Oct 2021 07:46  TPro  7.8    /  Alb  2.8    /  TBili  0.2    /  DBili  x      /  AST  12     /  ALT  19     /  AlkPhos  119    20 Oct 2021 18:56    PT/INR - ( 20 Oct 2021 18:56 )   PT: 11.9 sec;   INR: 1.02 ratio         PTT - ( 20 Oct 2021 18:56 )  PTT:28.6 sec  Troponin I, Serum: <.015 ng/mL (10-20-21 @ 18:56)  Cholesterol, Serum: 275 mg/dL (10-21-21 @ 22:25)  HDL Cholesterol, Serum: 49 mg/dL (10-21-21 @ 22:25)  Triglycerides, Serum: 238 mg/dL (10-21-21 @ 22:25)      12 Lead ECG:   Ventricular Rate 103 BPM  Atrial Rate 103 BPM  P-R Interval 150 ms  QRS Duration 116 ms  Q-T Interval 326 ms  QTC Calculation(Bazett) 427 ms  P Axis 53 degrees  R Axis -4 degrees  T Axis 88 degrees  Diagnosis Line Sinus tachycardia  Nonspecific ST and T wave abnormality  Abnormal ECG  When compared with ECG of 20-OCT-2021 18:29, (Unconfirmed)  No significant change was found  Confirmed by FLAKO REYEZ ([id number]) on 10/21/2021 4:06:04 PM (10-21-21 @ 10:58)      < from: MR Head No Cont (10.21.21 @ 09:16) >  IMPRESSION:  1)  The MR study confirms an acute left posterior thalamic lacunar infarct, unchanged in size as compared to the prior CT.  2)  underlying white matter disease also noted which may reflect chronic ischemic change. However an inflammatory etiology and/or demyelinating disease  cannot be totally excluded. Further clinical correlation recommended.  --- End of Report ---  < end of copied text >      < from: MR Angio Head No Cont (10.21.21 @ 09:17) >  IMPRESSION:    1. unremarkable MR angiographic study of the brain. Widely patent vasculature. No changes of underlying vasculopathy or stenosis.  --- End of Report ---  < end of copied text >    < from: US Duplex Carotid Arteries Complete, Bilateral (10.21.21 @ 08:51) >  IMPRESSION: No significant hemodynamic stenosis of either carotid artery.  Measurement of carotid stenosis is based on velocity parameters that correlate the residual internal carotid diameter with that of the more distal vessel in accordance with a method such as the [employer] Symptomatic Carotid Endarterectomy Trial (NASCET).  < end of copied text >    < from: CT Head No Cont (10.20.21 @ 19:10) >  IMPRESSION:  1)  there are lacunar infarcts in both thalami. A left thalamic lacune may be recent and/or subacute. This may be further assessed with MR imaging. The brain is otherwise unremarkable.  2)  the right sphenoid sinus is moderately opacified. Maxillary sinuses are incompletely imaged but there is thickening on the right.  Dr. Anderson notified at 7:30 PM.  --- End of Report ---  < end of copied text >

## 2021-10-22 NOTE — PROGRESS NOTE ADULT - REASON FOR ADMISSION
51y A1C with Estimated Average Glucose Result: 10.0 % (10-21-21 @ 11:36)   diabetes mellitus uncontrolled type 2

## 2021-10-22 NOTE — DISCHARGE NOTE NURSING/CASE MANAGEMENT/SOCIAL WORK - PATIENT PORTAL LINK FT
You can access the FollowMyHealth Patient Portal offered by [facility] by registering at the following website: [email or web address]. By joining [employer]’s FollowMyHealth portal, you will also be able to view your health information using other applications (apps) compatible with our system.

## 2021-10-22 NOTE — BH CONSULTATION LIAISON ASSESSMENT NOTE - NSBHCHARTREVIEWVS_PSY_A_CORE FT
Vital Signs Last 24 Hrs  T(C): 36.8 (22 Oct 2021 12:30), Max: 36.8 (21 Oct 2021 21:31)  T(F): 98.2 (22 Oct 2021 12:30), Max: 98.2 (21 Oct 2021 21:31)  HR: 104 (22 Oct 2021 12:30) (95 - 110)  BP: 142/90 (22 Oct 2021 12:30) (142/90 - 148/92)  BP(mean): --  RR: 17 (22 Oct 2021 12:30) (17 - 18)  SpO2: 95% (22 Oct 2021 12:30) (94% - 95%)

## 2021-10-22 NOTE — PROGRESS NOTE ADULT - ASSESSMENT
Likely CKD secondary to diabetes/Hypertension  Hypertension  Diabetes  CVA / Lacunar infarct  ? Urinary retention    Stable renal indices. Renal sonogram results noted. Check bladder scan. Monitor blood sugar levels. Insulin coverage as needed. Dietary restriction. Monitor BP trend. Titrate BP meds as needed. Salt restriction. Neurology follow up. Will follow electrolytes and renal function trend.

## 2022-01-06 PROCEDURE — 85027 COMPLETE CBC AUTOMATED: CPT

## 2022-01-06 PROCEDURE — 70544 MR ANGIOGRAPHY HEAD W/O DYE: CPT

## 2022-01-06 PROCEDURE — 70450 CT HEAD/BRAIN W/O DYE: CPT | Mod: MA

## 2022-01-06 PROCEDURE — 84484 ASSAY OF TROPONIN QUANT: CPT

## 2022-01-06 PROCEDURE — 80061 LIPID PANEL: CPT

## 2022-01-06 PROCEDURE — 70551 MRI BRAIN STEM W/O DYE: CPT

## 2022-01-06 PROCEDURE — 87635 SARS-COV-2 COVID-19 AMP PRB: CPT

## 2022-01-06 PROCEDURE — 93306 TTE W/DOPPLER COMPLETE: CPT

## 2022-01-06 PROCEDURE — 81001 URINALYSIS AUTO W/SCOPE: CPT

## 2022-01-06 PROCEDURE — 86769 SARS-COV-2 COVID-19 ANTIBODY: CPT

## 2022-01-06 PROCEDURE — 85025 COMPLETE CBC W/AUTO DIFF WBC: CPT

## 2022-01-06 PROCEDURE — 82553 CREATINE MB FRACTION: CPT

## 2022-01-06 PROCEDURE — 93880 EXTRACRANIAL BILAT STUDY: CPT

## 2022-01-06 PROCEDURE — 80053 COMPREHEN METABOLIC PANEL: CPT

## 2022-01-06 PROCEDURE — 96374 THER/PROPH/DIAG INJ IV PUSH: CPT

## 2022-01-06 PROCEDURE — 85730 THROMBOPLASTIN TIME PARTIAL: CPT

## 2022-01-06 PROCEDURE — 76770 US EXAM ABDO BACK WALL COMP: CPT

## 2022-01-06 PROCEDURE — 92610 EVALUATE SWALLOWING FUNCTION: CPT

## 2022-01-06 PROCEDURE — 99285 EMERGENCY DEPT VISIT HI MDM: CPT | Mod: 25

## 2022-01-06 PROCEDURE — 85610 PROTHROMBIN TIME: CPT

## 2022-01-06 PROCEDURE — 82962 GLUCOSE BLOOD TEST: CPT

## 2022-01-06 PROCEDURE — 36415 COLL VENOUS BLD VENIPUNCTURE: CPT

## 2022-01-06 PROCEDURE — 97166 OT EVAL MOD COMPLEX 45 MIN: CPT

## 2022-01-06 PROCEDURE — 97162 PT EVAL MOD COMPLEX 30 MIN: CPT

## 2022-01-06 PROCEDURE — 83036 HEMOGLOBIN GLYCOSYLATED A1C: CPT

## 2022-01-06 PROCEDURE — 93005 ELECTROCARDIOGRAM TRACING: CPT

## 2023-03-13 ENCOUNTER — INPATIENT (INPATIENT)
Facility: HOSPITAL | Age: 53
LOS: 1 days | Discharge: HOME | DRG: 247 | End: 2023-03-15
Attending: INTERNAL MEDICINE | Admitting: INTERNAL MEDICINE
Payer: MEDICAID

## 2023-03-13 VITALS
DIASTOLIC BLOOD PRESSURE: 83 MMHG | RESPIRATION RATE: 18 BRPM | WEIGHT: 222.01 LBS | TEMPERATURE: 98 F | SYSTOLIC BLOOD PRESSURE: 178 MMHG | HEART RATE: 84 BPM | HEIGHT: 69 IN | OXYGEN SATURATION: 98 %

## 2023-03-13 DIAGNOSIS — I20.0 UNSTABLE ANGINA: ICD-10-CM

## 2023-03-13 LAB
ALBUMIN SERPL ELPH-MCNC: 3.2 G/DL — LOW (ref 3.3–5.2)
ALP SERPL-CCNC: 128 U/L — HIGH (ref 40–120)
ALT FLD-CCNC: 16 U/L — SIGNIFICANT CHANGE UP
ANION GAP SERPL CALC-SCNC: 9 MMOL/L — SIGNIFICANT CHANGE UP (ref 5–17)
APTT BLD: 26.6 SEC — LOW (ref 27.5–35.5)
AST SERPL-CCNC: 18 U/L — SIGNIFICANT CHANGE UP
BASOPHILS # BLD AUTO: 0.04 K/UL — SIGNIFICANT CHANGE UP (ref 0–0.2)
BASOPHILS NFR BLD AUTO: 0.7 % — SIGNIFICANT CHANGE UP (ref 0–2)
BILIRUB SERPL-MCNC: 0.3 MG/DL — LOW (ref 0.4–2)
BUN SERPL-MCNC: 17.5 MG/DL — SIGNIFICANT CHANGE UP (ref 8–20)
CALCIUM SERPL-MCNC: 8.5 MG/DL — SIGNIFICANT CHANGE UP (ref 8.4–10.5)
CHLORIDE SERPL-SCNC: 102 MMOL/L — SIGNIFICANT CHANGE UP (ref 96–108)
CO2 SERPL-SCNC: 27 MMOL/L — SIGNIFICANT CHANGE UP (ref 22–29)
CREAT SERPL-MCNC: 2.06 MG/DL — HIGH (ref 0.5–1.3)
EGFR: 38 ML/MIN/1.73M2 — LOW
EOSINOPHIL # BLD AUTO: 0.19 K/UL — SIGNIFICANT CHANGE UP (ref 0–0.5)
EOSINOPHIL NFR BLD AUTO: 3.2 % — SIGNIFICANT CHANGE UP (ref 0–6)
FLUAV AG NPH QL: SIGNIFICANT CHANGE UP
FLUBV AG NPH QL: SIGNIFICANT CHANGE UP
GLUCOSE SERPL-MCNC: 255 MG/DL — HIGH (ref 70–99)
HCT VFR BLD CALC: 30.8 % — LOW (ref 39–50)
HGB BLD-MCNC: 10.5 G/DL — LOW (ref 13–17)
IMM GRANULOCYTES NFR BLD AUTO: 0.2 % — SIGNIFICANT CHANGE UP (ref 0–0.9)
INR BLD: 1.02 RATIO — SIGNIFICANT CHANGE UP (ref 0.88–1.16)
LYMPHOCYTES # BLD AUTO: 1.65 K/UL — SIGNIFICANT CHANGE UP (ref 1–3.3)
LYMPHOCYTES # BLD AUTO: 27.8 % — SIGNIFICANT CHANGE UP (ref 13–44)
MCHC RBC-ENTMCNC: 27.9 PG — SIGNIFICANT CHANGE UP (ref 27–34)
MCHC RBC-ENTMCNC: 34.1 GM/DL — SIGNIFICANT CHANGE UP (ref 32–36)
MCV RBC AUTO: 81.9 FL — SIGNIFICANT CHANGE UP (ref 80–100)
MONOCYTES # BLD AUTO: 0.38 K/UL — SIGNIFICANT CHANGE UP (ref 0–0.9)
MONOCYTES NFR BLD AUTO: 6.4 % — SIGNIFICANT CHANGE UP (ref 2–14)
NEUTROPHILS # BLD AUTO: 3.67 K/UL — SIGNIFICANT CHANGE UP (ref 1.8–7.4)
NEUTROPHILS NFR BLD AUTO: 61.7 % — SIGNIFICANT CHANGE UP (ref 43–77)
PLATELET # BLD AUTO: 307 K/UL — SIGNIFICANT CHANGE UP (ref 150–400)
POTASSIUM SERPL-MCNC: 4.1 MMOL/L — SIGNIFICANT CHANGE UP (ref 3.5–5.3)
POTASSIUM SERPL-SCNC: 4.1 MMOL/L — SIGNIFICANT CHANGE UP (ref 3.5–5.3)
PROT SERPL-MCNC: 6.7 G/DL — SIGNIFICANT CHANGE UP (ref 6.6–8.7)
PROTHROM AB SERPL-ACNC: 11.8 SEC — SIGNIFICANT CHANGE UP (ref 10.5–13.4)
RBC # BLD: 3.76 M/UL — LOW (ref 4.2–5.8)
RBC # FLD: 13.1 % — SIGNIFICANT CHANGE UP (ref 10.3–14.5)
RSV RNA NPH QL NAA+NON-PROBE: SIGNIFICANT CHANGE UP
SARS-COV-2 RNA SPEC QL NAA+PROBE: SIGNIFICANT CHANGE UP
SODIUM SERPL-SCNC: 138 MMOL/L — SIGNIFICANT CHANGE UP (ref 135–145)
TROPONIN T SERPL-MCNC: <0.01 NG/ML — SIGNIFICANT CHANGE UP (ref 0–0.06)
WBC # BLD: 5.94 K/UL — SIGNIFICANT CHANGE UP (ref 3.8–10.5)
WBC # FLD AUTO: 5.94 K/UL — SIGNIFICANT CHANGE UP (ref 3.8–10.5)

## 2023-03-13 PROCEDURE — 93010 ELECTROCARDIOGRAM REPORT: CPT | Mod: 76

## 2023-03-13 PROCEDURE — 99285 EMERGENCY DEPT VISIT HI MDM: CPT

## 2023-03-13 PROCEDURE — 99223 1ST HOSP IP/OBS HIGH 75: CPT

## 2023-03-13 PROCEDURE — 71045 X-RAY EXAM CHEST 1 VIEW: CPT | Mod: 26

## 2023-03-13 RX ORDER — ONDANSETRON 8 MG/1
4 TABLET, FILM COATED ORAL EVERY 8 HOURS
Refills: 0 | Status: DISCONTINUED | OUTPATIENT
Start: 2023-03-13 | End: 2023-03-15

## 2023-03-13 RX ORDER — LANOLIN ALCOHOL/MO/W.PET/CERES
3 CREAM (GRAM) TOPICAL AT BEDTIME
Refills: 0 | Status: DISCONTINUED | OUTPATIENT
Start: 2023-03-13 | End: 2023-03-15

## 2023-03-13 RX ORDER — ACETYLCYSTEINE 200 MG/ML
1200 VIAL (ML) MISCELLANEOUS EVERY 12 HOURS
Refills: 0 | Status: DISCONTINUED | OUTPATIENT
Start: 2023-03-13 | End: 2023-03-15

## 2023-03-13 RX ORDER — SODIUM CHLORIDE 9 MG/ML
3 INJECTION INTRAMUSCULAR; INTRAVENOUS; SUBCUTANEOUS EVERY 8 HOURS
Refills: 0 | Status: DISCONTINUED | OUTPATIENT
Start: 2023-03-13 | End: 2023-03-15

## 2023-03-13 RX ORDER — NITROGLYCERIN 6.5 MG
0.4 CAPSULE, EXTENDED RELEASE ORAL
Refills: 0 | Status: DISCONTINUED | OUTPATIENT
Start: 2023-03-13 | End: 2023-03-15

## 2023-03-13 RX ORDER — ACETAMINOPHEN 500 MG
650 TABLET ORAL EVERY 6 HOURS
Refills: 0 | Status: DISCONTINUED | OUTPATIENT
Start: 2023-03-13 | End: 2023-03-15

## 2023-03-13 NOTE — ED PROVIDER NOTE - PHYSICAL EXAMINATION
General: NAD, well appearing  HEENT: Normocephalic, atraumatic  Neck: No apparent stiffness or JVD  Pulm: Chest wall symmetric and nontender, lungs clear to ascultation   Cardiac: Regular rate and regular rhythm  Abdomen: Nontender and nondistended  Skin: Skin is warm, dry and intact without rashes or lesions.  Neuro: No motor or sensory deficits above reported baseline  MSK: No deformity or tenderness

## 2023-03-13 NOTE — ED PROVIDER NOTE - OBJECTIVE STATEMENT
52M hx HTN, HLD, DM2, CVA x2 on ASA with residual rt sided numbness, sent from [facility] office w/ Dr Chen for abnormal EKG and unstable angina.  Patient states worsening AGGARWAL and chest pressure with activity for 1 month.  Note from cardiology states admit for work and possible cath.  Patient otherwise denies other pertinent medical problems.  Denies any substance use.

## 2023-03-13 NOTE — ED PROVIDER NOTE - NSICDXPASTMEDICALHX_GEN_ALL_CORE_FT
PAST MEDICAL HISTORY:  CVA (cerebrovascular accident)     Diabetes mellitus     HLD (hyperlipidemia)     HTN (hypertension)

## 2023-03-13 NOTE — H&P ADULT - HISTORY OF PRESENT ILLNESS
53 y/o male with hx of CVA x2 in 10/21 with residual right sided weakness/sensory loss, no dysphagia or use of assistive devices, HTN, HLD, DM-2, CKD-3. Patient was sent into for admission by his Cardiologist after he presented there c/o worsening exertional CP associated with SOB that is better with rest.  EKG in office with inferolateral T wave inversions which are new from prior. Patient denies any cardiac history, no fever, chills, N/V. He denies any changes in medications. In ED repeat EKG confirmed changes seen in Cardiology office. Vitals stable, Trop neg. Denies CP at this time.

## 2023-03-13 NOTE — H&P ADULT - ASSESSMENT
53 y/o male with exertional CP, New EKG changes, Hx of CVA w/ right sided residual weakness/sensory deficits, HTN, HLD, DM-2, CKD-3    Exertional CP:  -EKG as above  -No ACS at this time, trop neg  -Vitals stable  -Cont. Aspirin, statin, BB  -Check A1c/FLP  -Tele monitor  -NPO except meds for likely LHC in AM  -Mucomyst for contrast nephropathy prophylaxis, maintain euvolemia  -Start on IVF as well to help prevent DIRK  -VC boots, ambulate as tolerated     CVA old:  -No new neurologic complaints reported  -Cont. o/p regimen    HTN:  -Cont. o/p regimen  -DASH diet    HLD:  -Statin  -check FLP    DM-2:  -Accu checks Q 6 hours while npo  -Lantus  -ADA diet when no longer NPO  -Check A1c     CKD-3:  -At baseline  -Avoid nephrotoxins, renally dose meds  -High risk for DIRK with LHC planned, monitor renal fxn   -IVF, Mucomyst PO    Discussed with Patient, ED staff

## 2023-03-13 NOTE — H&P ADULT - NEGATIVE PSYCHIATRIC SYMPTOMS
General Sunscreen Counseling: I recommended a broad spectrum sunscreen with a SPF of 50 or higher.  I explained that SPF 30 sunscreens block approximately 97 percent of the sun's harmful rays.  Sunscreens should be applied at least 15 minutes prior to expected sun exposure and then every 2 hours after that as long as sun exposure continues. If swimming or exercising sunscreen should be reapplied every 45 minutes to an hour after getting wet or sweating.  One ounce, or the equivalent of a shot glass full of sunscreen, is adequate to protect the skin not covered by a bathing suit. I also recommended a lip balm with a sunscreen as well. Sun protective clothing can be used in lieu of sunscreen but must be worn the entire time you are exposed to the sun's rays. ABCDEs/monthly SSE/FSE at least annually.
Detail Level: Generalized
no suicidal ideation

## 2023-03-14 ENCOUNTER — TRANSCRIPTION ENCOUNTER (OUTPATIENT)
Age: 53
End: 2023-03-14

## 2023-03-14 PROBLEM — Z86.69 PERSONAL HISTORY OF OTHER DISEASES OF THE NERVOUS SYSTEM AND SENSE ORGANS: Chronic | Status: ACTIVE | Noted: 2021-10-20

## 2023-03-14 PROBLEM — E11.9 TYPE 2 DIABETES MELLITUS WITHOUT COMPLICATIONS: Chronic | Status: ACTIVE | Noted: 2021-10-20

## 2023-03-14 PROBLEM — I10 ESSENTIAL (PRIMARY) HYPERTENSION: Chronic | Status: ACTIVE | Noted: 2021-10-20

## 2023-03-14 PROBLEM — E78.5 HYPERLIPIDEMIA, UNSPECIFIED: Chronic | Status: ACTIVE | Noted: 2021-10-20

## 2023-03-14 LAB
A1C WITH ESTIMATED AVERAGE GLUCOSE RESULT: 9.6 % — HIGH (ref 4–5.6)
ANION GAP SERPL CALC-SCNC: 8 MMOL/L — SIGNIFICANT CHANGE UP (ref 5–17)
BUN SERPL-MCNC: 17.4 MG/DL — SIGNIFICANT CHANGE UP (ref 8–20)
CALCIUM SERPL-MCNC: 8.4 MG/DL — SIGNIFICANT CHANGE UP (ref 8.4–10.5)
CHLORIDE SERPL-SCNC: 102 MMOL/L — SIGNIFICANT CHANGE UP (ref 96–108)
CHOLEST SERPL-MCNC: 188 MG/DL — SIGNIFICANT CHANGE UP
CO2 SERPL-SCNC: 27 MMOL/L — SIGNIFICANT CHANGE UP (ref 22–29)
CREAT SERPL-MCNC: 1.93 MG/DL — HIGH (ref 0.5–1.3)
EGFR: 41 ML/MIN/1.73M2 — LOW
ESTIMATED AVERAGE GLUCOSE: 229 MG/DL — HIGH (ref 68–114)
GLUCOSE BLDC GLUCOMTR-MCNC: 146 MG/DL — HIGH (ref 70–99)
GLUCOSE BLDC GLUCOMTR-MCNC: 193 MG/DL — HIGH (ref 70–99)
GLUCOSE BLDC GLUCOMTR-MCNC: 193 MG/DL — HIGH (ref 70–99)
GLUCOSE BLDC GLUCOMTR-MCNC: 261 MG/DL — HIGH (ref 70–99)
GLUCOSE SERPL-MCNC: 176 MG/DL — HIGH (ref 70–99)
HCT VFR BLD CALC: 27.4 % — LOW (ref 39–50)
HDLC SERPL-MCNC: 46 MG/DL — SIGNIFICANT CHANGE UP
HGB BLD-MCNC: 9.7 G/DL — LOW (ref 13–17)
LIPID PNL WITH DIRECT LDL SERPL: 118 MG/DL — HIGH
MCHC RBC-ENTMCNC: 29 PG — SIGNIFICANT CHANGE UP (ref 27–34)
MCHC RBC-ENTMCNC: 35.4 GM/DL — SIGNIFICANT CHANGE UP (ref 32–36)
MCV RBC AUTO: 82 FL — SIGNIFICANT CHANGE UP (ref 80–100)
NON HDL CHOLESTEROL: 142 MG/DL — HIGH
PLATELET # BLD AUTO: 271 K/UL — SIGNIFICANT CHANGE UP (ref 150–400)
POTASSIUM SERPL-MCNC: 3.6 MMOL/L — SIGNIFICANT CHANGE UP (ref 3.5–5.3)
POTASSIUM SERPL-SCNC: 3.6 MMOL/L — SIGNIFICANT CHANGE UP (ref 3.5–5.3)
RBC # BLD: 3.34 M/UL — LOW (ref 4.2–5.8)
RBC # FLD: 13.1 % — SIGNIFICANT CHANGE UP (ref 10.3–14.5)
SODIUM SERPL-SCNC: 137 MMOL/L — SIGNIFICANT CHANGE UP (ref 135–145)
TRIGL SERPL-MCNC: 122 MG/DL — SIGNIFICANT CHANGE UP
TROPONIN T SERPL-MCNC: <0.01 NG/ML — SIGNIFICANT CHANGE UP (ref 0–0.06)
WBC # BLD: 5.63 K/UL — SIGNIFICANT CHANGE UP (ref 3.8–10.5)
WBC # FLD AUTO: 5.63 K/UL — SIGNIFICANT CHANGE UP (ref 3.8–10.5)

## 2023-03-14 PROCEDURE — 93010 ELECTROCARDIOGRAM REPORT: CPT

## 2023-03-14 PROCEDURE — 99233 SBSQ HOSP IP/OBS HIGH 50: CPT

## 2023-03-14 RX ORDER — METOPROLOL TARTRATE 50 MG
50 TABLET ORAL
Refills: 0 | Status: DISCONTINUED | OUTPATIENT
Start: 2023-03-14 | End: 2023-03-15

## 2023-03-14 RX ORDER — DEXTROSE 50 % IN WATER 50 %
25 SYRINGE (ML) INTRAVENOUS ONCE
Refills: 0 | Status: DISCONTINUED | OUTPATIENT
Start: 2023-03-14 | End: 2023-03-15

## 2023-03-14 RX ORDER — DEXTROSE 50 % IN WATER 50 %
12.5 SYRINGE (ML) INTRAVENOUS ONCE
Refills: 0 | Status: DISCONTINUED | OUTPATIENT
Start: 2023-03-14 | End: 2023-03-15

## 2023-03-14 RX ORDER — CLOPIDOGREL BISULFATE 75 MG/1
75 TABLET, FILM COATED ORAL DAILY
Refills: 0 | Status: DISCONTINUED | OUTPATIENT
Start: 2023-03-15 | End: 2023-03-15

## 2023-03-14 RX ORDER — HYDROCORTISONE 20 MG
125 TABLET ORAL ONCE
Refills: 0 | Status: DISCONTINUED | OUTPATIENT
Start: 2023-03-14 | End: 2023-03-14

## 2023-03-14 RX ORDER — IPRATROPIUM/ALBUTEROL SULFATE 18-103MCG
3 AEROSOL WITH ADAPTER (GRAM) INHALATION EVERY 6 HOURS
Refills: 0 | Status: DISCONTINUED | OUTPATIENT
Start: 2023-03-14 | End: 2023-03-15

## 2023-03-14 RX ORDER — SODIUM CHLORIDE 9 MG/ML
1000 INJECTION INTRAMUSCULAR; INTRAVENOUS; SUBCUTANEOUS
Refills: 0 | Status: COMPLETED | OUTPATIENT
Start: 2023-03-14 | End: 2023-03-14

## 2023-03-14 RX ORDER — INSULIN LISPRO 100/ML
2 VIAL (ML) SUBCUTANEOUS ONCE
Refills: 0 | Status: COMPLETED | OUTPATIENT
Start: 2023-03-14 | End: 2023-03-14

## 2023-03-14 RX ORDER — INSULIN GLARGINE 100 [IU]/ML
13 INJECTION, SOLUTION SUBCUTANEOUS ONCE
Refills: 0 | Status: COMPLETED | OUTPATIENT
Start: 2023-03-14 | End: 2023-03-14

## 2023-03-14 RX ORDER — INSULIN GLARGINE 100 [IU]/ML
13 INJECTION, SOLUTION SUBCUTANEOUS AT BEDTIME
Refills: 0 | Status: DISCONTINUED | OUTPATIENT
Start: 2023-03-14 | End: 2023-03-15

## 2023-03-14 RX ORDER — SODIUM CHLORIDE 9 MG/ML
1000 INJECTION, SOLUTION INTRAVENOUS
Refills: 0 | Status: DISCONTINUED | OUTPATIENT
Start: 2023-03-14 | End: 2023-03-15

## 2023-03-14 RX ORDER — DEXTROSE 50 % IN WATER 50 %
15 SYRINGE (ML) INTRAVENOUS ONCE
Refills: 0 | Status: DISCONTINUED | OUTPATIENT
Start: 2023-03-14 | End: 2023-03-15

## 2023-03-14 RX ORDER — ASPIRIN/CALCIUM CARB/MAGNESIUM 324 MG
81 TABLET ORAL DAILY
Refills: 0 | Status: DISCONTINUED | OUTPATIENT
Start: 2023-03-14 | End: 2023-03-15

## 2023-03-14 RX ORDER — AMLODIPINE BESYLATE 2.5 MG/1
10 TABLET ORAL DAILY
Refills: 0 | Status: DISCONTINUED | OUTPATIENT
Start: 2023-03-14 | End: 2023-03-15

## 2023-03-14 RX ORDER — INSULIN LISPRO 100/ML
VIAL (ML) SUBCUTANEOUS EVERY 6 HOURS
Refills: 0 | Status: DISCONTINUED | OUTPATIENT
Start: 2023-03-14 | End: 2023-03-15

## 2023-03-14 RX ORDER — ATORVASTATIN CALCIUM 80 MG/1
80 TABLET, FILM COATED ORAL AT BEDTIME
Refills: 0 | Status: DISCONTINUED | OUTPATIENT
Start: 2023-03-14 | End: 2023-03-15

## 2023-03-14 RX ORDER — DIPHENHYDRAMINE HCL 50 MG
50 CAPSULE ORAL ONCE
Refills: 0 | Status: COMPLETED | OUTPATIENT
Start: 2023-03-14 | End: 2023-03-14

## 2023-03-14 RX ORDER — ASPIRIN/CALCIUM CARB/MAGNESIUM 324 MG
325 TABLET ORAL DAILY
Refills: 0 | Status: DISCONTINUED | OUTPATIENT
Start: 2023-03-14 | End: 2023-03-14

## 2023-03-14 RX ORDER — HYDROCORTISONE 20 MG
100 TABLET ORAL ONCE
Refills: 0 | Status: COMPLETED | OUTPATIENT
Start: 2023-03-14 | End: 2023-03-14

## 2023-03-14 RX ORDER — GLUCAGON INJECTION, SOLUTION 0.5 MG/.1ML
1 INJECTION, SOLUTION SUBCUTANEOUS ONCE
Refills: 0 | Status: DISCONTINUED | OUTPATIENT
Start: 2023-03-14 | End: 2023-03-15

## 2023-03-14 RX ORDER — FAMOTIDINE 10 MG/ML
20 INJECTION INTRAVENOUS DAILY
Refills: 0 | Status: DISCONTINUED | OUTPATIENT
Start: 2023-03-14 | End: 2023-03-15

## 2023-03-14 RX ADMIN — SODIUM CHLORIDE 75 MILLILITER(S): 9 INJECTION INTRAMUSCULAR; INTRAVENOUS; SUBCUTANEOUS at 17:43

## 2023-03-14 RX ADMIN — Medication 650 MILLIGRAM(S): at 03:20

## 2023-03-14 RX ADMIN — INSULIN GLARGINE 13 UNIT(S): 100 INJECTION, SOLUTION SUBCUTANEOUS at 02:02

## 2023-03-14 RX ADMIN — Medication 2 UNIT(S): at 23:41

## 2023-03-14 RX ADMIN — SODIUM CHLORIDE 3 MILLILITER(S): 9 INJECTION INTRAMUSCULAR; INTRAVENOUS; SUBCUTANEOUS at 13:14

## 2023-03-14 RX ADMIN — SODIUM CHLORIDE 100 MILLILITER(S): 9 INJECTION, SOLUTION INTRAVENOUS at 13:51

## 2023-03-14 RX ADMIN — ATORVASTATIN CALCIUM 80 MILLIGRAM(S): 80 TABLET, FILM COATED ORAL at 23:13

## 2023-03-14 RX ADMIN — Medication 50 MILLIGRAM(S): at 05:28

## 2023-03-14 RX ADMIN — AMLODIPINE BESYLATE 10 MILLIGRAM(S): 2.5 TABLET ORAL at 05:28

## 2023-03-14 RX ADMIN — Medication 325 MILLIGRAM(S): at 12:03

## 2023-03-14 RX ADMIN — INSULIN GLARGINE 13 UNIT(S): 100 INJECTION, SOLUTION SUBCUTANEOUS at 23:14

## 2023-03-14 RX ADMIN — Medication 2: at 17:16

## 2023-03-14 RX ADMIN — Medication 1200 MILLIGRAM(S): at 05:33

## 2023-03-14 RX ADMIN — SODIUM CHLORIDE 3 MILLILITER(S): 9 INJECTION INTRAMUSCULAR; INTRAVENOUS; SUBCUTANEOUS at 23:38

## 2023-03-14 RX ADMIN — SODIUM CHLORIDE 100 MILLILITER(S): 9 INJECTION, SOLUTION INTRAVENOUS at 04:06

## 2023-03-14 RX ADMIN — Medication 100 MILLIGRAM(S): at 17:23

## 2023-03-14 RX ADMIN — Medication 650 MILLIGRAM(S): at 05:37

## 2023-03-14 RX ADMIN — SODIUM CHLORIDE 3 MILLILITER(S): 9 INJECTION INTRAMUSCULAR; INTRAVENOUS; SUBCUTANEOUS at 05:32

## 2023-03-14 RX ADMIN — Medication 2: at 12:05

## 2023-03-14 RX ADMIN — Medication 50 MILLIGRAM(S): at 17:16

## 2023-03-14 RX ADMIN — Medication 10 MILLIGRAM(S): at 12:03

## 2023-03-14 RX ADMIN — Medication 1200 MILLIGRAM(S): at 17:40

## 2023-03-14 RX ADMIN — Medication 50 MILLIGRAM(S): at 17:23

## 2023-03-14 RX ADMIN — FAMOTIDINE 20 MILLIGRAM(S): 10 INJECTION INTRAVENOUS at 12:03

## 2023-03-14 RX ADMIN — Medication 650 MILLIGRAM(S): at 23:14

## 2023-03-14 NOTE — PROGRESS NOTE ADULT - SUBJECTIVE AND OBJECTIVE BOX
Interventional Cardiology NP pre/post procedure note:    -For [facility] with Dr. Bernard    EKG: < from: 12 Lead ECG (03.13.23 @ 23:38) >  Ventricular Rate 71 BPM    Atrial Rate 71 BPM    P-R Interval 172 ms    QRS Duration 106 ms    Q-T Interval 398 ms    QTC Calculation(Bazett) 432 ms    P Axis 57 degrees    R Axis -7 degrees    T Axis 199 degrees    Diagnosis Line Normal sinus rhythm  ST & T wave abnormality, consider inferior ischemia  ST & T wave abnormality, consider anterolateral ischemia  Abnormal ECG    Confirmed by KARI SPAULDING ([id number]) on 3/14/2023 8:51:16 AM    < end of copied text >    TELE: NSR 70s    MEDICATIONS  (STANDING):  acetylcysteine  Oral Solution 1200 milliGRAM(s) Oral every 12 hours  amLODIPine   Tablet 10 milliGRAM(s) Oral daily  aspirin enteric coated 325 milliGRAM(s) Oral daily  atorvastatin 80 milliGRAM(s) Oral at bedtime  dextrose 5%. 1000 milliLiter(s) (50 mL/Hr) IV Continuous <Continuous>  dextrose 5%. 1000 milliLiter(s) (100 mL/Hr) IV Continuous <Continuous>  dextrose 50% Injectable 25 Gram(s) IV Push once  dextrose 50% Injectable 12.5 Gram(s) IV Push once  dextrose 50% Injectable 25 Gram(s) IV Push once  famotidine    Tablet 20 milliGRAM(s) Oral daily  glucagon  Injectable 1 milliGRAM(s) IntraMuscular once  insulin glargine Injectable (LANTUS) 13 Unit(s) SubCutaneous at bedtime  insulin lispro (ADMELOG) corrective regimen sliding scale   SubCutaneous every 6 hours  metoprolol tartrate 50 milliGRAM(s) Oral two times a day  PARoxetine 10 milliGRAM(s) Oral daily  sodium chloride 0.45%. 1000 milliLiter(s) (100 mL/Hr) IV Continuous <Continuous>  sodium chloride 0.9% lock flush 3 milliLiter(s) IV Push every 8 hours  sodium chloride 0.9%. 1000 milliLiter(s) (75 mL/Hr) IV Continuous <Continuous>    MEDICATIONS  (PRN):  acetaminophen     Tablet .. 650 milliGRAM(s) Oral every 6 hours PRN Temp greater or equal to 38C (100.4F), Mild Pain (1 - 3)  albuterol/ipratropium for Nebulization 3 milliLiter(s) Nebulizer every 6 hours PRN Shortness of Breath and/or Wheezing  aluminum hydroxide/magnesium hydroxide/simethicone Suspension 30 milliLiter(s) Oral every 4 hours PRN Dyspepsia  dextrose Oral Gel 15 Gram(s) Oral once PRN Blood Glucose LESS THAN 70 milliGRAM(s)/deciliter  melatonin 3 milliGRAM(s) Oral at bedtime PRN Insomnia  nitroglycerin     SubLingual 0.4 milliGRAM(s) SubLingual every 5 minutes PRN Chest Pain  ondansetron Injectable 4 milliGRAM(s) IV Push every 8 hours PRN Nausea and/or Vomiting      Allergies  No Known Drug Allergies  shellfish (Anaphylaxis)  shellfish (Short breath)      PAST MEDICAL & SURGICAL HISTORY:  Primary hypertension      H/O Bell&#x27;s palsy      Hyperlipemia      Type 2 diabetes mellitus      CVA (cerebrovascular accident)      HTN (hypertension)      HLD (hyperlipidemia)      Diabetes mellitus      No significant past surgical history          Vital Signs Last 24 Hrs  T(C): 36.4 (14 Mar 2023 07:38), Max: 36.9 (13 Mar 2023 20:14)  T(F): 97.6 (14 Mar 2023 07:38), Max: 98.5 (13 Mar 2023 20:14)  HR: 79 (14 Mar 2023 16:20) (64 - 86)  BP: 153/83 (14 Mar 2023 16:20) (127/78 - 178/83)  BP(mean): 116 (14 Mar 2023 03:08) (94 - 116)  RR: 16 (14 Mar 2023 16:20) (16 - 18)  SpO2: 100% (14 Mar 2023 16:20) (97% - 100%)    Parameters below as of 14 Mar 2023 16:20  Patient On (Oxygen Delivery Method): room air        Physical Exam:  Constitutional: NAD, AAOx3  Cardiovascular: +S1S2 RRR  Pulmonary: CTA b/l, unlabored  GI: soft NTND +BS  Extremities: no pedal edema, +distal pulses b/l  Neuro: non focal, MOSLEY x4  ASA 3  Mallampati 2  GFR 41  creat 1.93  BRA 3%    LABS:                        9.7    5.63  )-----------( 271      ( 14 Mar 2023 06:28 )             27.4     03-14    137  |  102  |  17.4  ----------------------------<  176<H>  3.6   |  27.0  |  1.93<H>    Ca    8.4      14 Mar 2023 06:28    TPro  6.7  /  Alb  3.2<L>  /  TBili  0.3<L>  /  DBili  x   /  AST  18  /  ALT  16  /  AlkPhos  128<H>  03-13    PT/INR - ( 13 Mar 2023 22:00 )   PT: 11.8 sec;   INR: 1.02 ratio         PTT - ( 13 Mar 2023 22:00 )  PTT:26.6 sec      RADIOLOGY & ADDITIONAL TESTS:  < from: Xray Chest 1 View- PORTABLE-Urgent (03.13.23 @ 21:55) >  ACC: [id number] EXAM:  XR CHEST PORTABLE URGENT 1V   ORDERED BY: HEIKE BOJORQUEZ     PROCEDURE DATE:  03/13/2023          INTERPRETATION:  Portable chest radiograph    CLINICAL INFORMATION: Chest pain    TECHNIQUE:  Portable  AP chest radiograph.    COMPARISON: 3/13/2023 chest x-ray .    FINDINGS:  CATHETERS AND TUBES: None    PULMONARY: The visualized lungs are clear of airspace consolidations or   pleural effusions.   No pneumothorax.    HEART/VASCULAR: The heart and mediastinum size and configuration are   within normal limits.    BONES: Visualized osseous thorax intact.    IMPRESSION:   No radiographic evidence of active chest disease.    --- End of Report ---      < end of copied text >

## 2023-03-14 NOTE — DISCHARGE NOTE PROVIDER - CARE PROVIDER_API CALL
Jacob Ramirez)  Cardiovascular Disease; Internal Medicine; Nuclear Cardiology  [address] [place] [address]  Phone: [phone]  Fax: [phone]  Established Patient  Follow Up Time: 2 weeks   Jacob Ramirez)  Cardiovascular Disease; Internal Medicine; Nuclear Cardiology  [address] [place] [address]  Phone: [phone]  Fax: [phone]  Established Patient  Follow Up Time: 2 weeks    Cali Medel)  Gastroenterology; Internal Medicine  [address] [facility], [address] [place] [address]  Phone: [phone]  Fax: [phone]  Follow Up Time:     Adrián López (DO)  Medicine  [address] [facility], [place] [address]  Phone: [phone]  Fax: [phone]  Follow Up Time:     Kit Diaz)  EndocrinologyMetabDiabetes; Internal Medicine  [address] [place] [address]  Phone: [phone]  Fax: [phone]  Follow Up Time:

## 2023-03-14 NOTE — PROGRESS NOTE ADULT - ASSESSMENT
A/P: 51 y/o male with h/o HTN, HLD, DM2, CKD3, CVA x2 on ASA with residual rt sided numbness, sent from [facility] office w/ Dr Escobedo for abnormal EKG and unstable angina.  Patient states worsening AGGARWAL and chest pressure with activity for 1 month.  Trop negative x 2.  For [facility] with Dr. Bernard for further evaluation.    -NPO for procedure  -Consent obtained by MD  -IV benadryl 50mg and solu-cortef 100mg IV x 1 prophylactically preprocedure secondary to anaphylaxis to shellfish in the past   -CKD 3--IVF hydration pre/post procedure to prevent DIRK  -ASA taken this am   A/P: 53 y/o male with h/o HTN, HLD, DM2, CKD3, CVA x2 on ASA with residual rt sided numbness, sent from [facility] office w/ Dr Escobedo for abnormal EKG and unstable angina.  Patient states worsening AGGARWAL and chest pressure with activity for 1 month.  Trop negative x 2.  For [facility] with Dr. Bernard for further evaluation.    -NPO for procedure  -Consent obtained by MD  -IV benadryl 50mg and solu-cortef 100mg IV x 1 prophylactically preprocedure secondary to anaphylaxis to shellfish in the past   -CKD 3--IVF hydration pre/post procedure to prevent DIRK  -ASA taken this am    CARDIOLOGY NP ADDENDUM:  -s/p LHC/PCI via RRA with Dr. Bernard:   < from: Cardiac Catheterization (03.14.23 @ 19:28) >  Indications:               Unstable angina     Conclusions:   No LV gram due to renal insufficiency     Mild to moderate stenosis of the mid LAD   Normal Ramus and LCX   80% stenosis of the proximal RCA, successfully stented   Recommendations:   Aspirin and Plavix; Aggressive risk factor modification with diet,  exercise, and a goal LDL of less than 70.    Acute complication:    No complications     < end of copied text >  -Intraprocedure:   Heparin 11,000 units IV  Plavix 600mg PO  Omnipaque 58cc  -Right radial band in place--site benign without hematoma/bleeding; RUE warm/mobile/acyanotic; + right ulnar pulse  -EKG post PCI: NSR 69 bpm unchanged from previous  -Remove radial band 2 hours post procedure at 2230  -Bedrest x 2 hours post procedure until 2230 then OOB  -Post cardiac cath orders  -Radial precautions  -Labs and EKG in am  -Continue IVF hydration post procedure as per previous order to prevent DIRK  -Continue current medical therapy including statin, beta blocker  -Dual anti platelet therapy with aspirin/ plavix reinforced importance of strict adherence to DAPT   -Follow up outpt in 2 weeks with Cardiologist Dr. Ramirez  -Lifestyle modifications discussed to reduce cardiovascular risk factors including weight reduction, smoking cessation, medication compliance, and routine follow up with Cardiologist to track your BMI, cholesterol, and glucose levels.   -cardiac rehab info provided/referral and communication to cardiac rehab completed  -Discussed with Dr. Bernard  -Additional plan as per Attending MD

## 2023-03-14 NOTE — ED ADULT NURSE NOTE - CHIEF COMPLAINT QUOTE
c/o of intermittent  left side and mid chest pain x  2 weeks. Went to cardiologist today and had an abnormal EKG and was told to come to the hospital. Hx of HTN and on aspirin

## 2023-03-14 NOTE — ED ADULT NURSE NOTE - TEMPLATE
Called pt informed and sent a rx over to [facility] per pt request for 90 day supply  Cardiac

## 2023-03-14 NOTE — PATIENT PROFILE ADULT - CONTRAINDICATIONS & PRECAUTIONS (SELECT ALL THAT APPLY)
TC from employee. Exhibiting s/s of COVID.  Order placed for testing.  Advised to be off work pending test results.  Advised to use regular call in procedure.  To contact PCP or go to ED if s/s change, worsen or become concerning.  Discussed use of symptom tracker.  Employee verbalizes understanding and agrees with the plan.  Off work email sent to TM and manager.      Patient/surrogate refused vaccine...

## 2023-03-14 NOTE — DISCHARGE NOTE PROVIDER - NSDCMRMEDTOKEN_GEN_ALL_CORE_FT
amLODIPine 5 mg oral tablet: 1 tab(s) orally once a day  aspirin 325 mg oral delayed release tablet: 1 tab(s) orally once a day  Aspirin Enteric Coated 325 mg oral delayed release tablet: 1 tab(s) orally once a day  atorvastatin 80 mg oral tablet: 1 tab(s) orally once a day (at bedtime)  famotidine 20 mg oral tablet: 1 tab(s) orally once a day  glimepiride 2 mg oral tablet: 1 tab(s) orally every 12 hours   glimepiride 2 mg oral tablet: 1 tab(s) orally once a day  Lantus 100 units/mL subcutaneous solution: 13 unit(s) subcutaneous once a day  Lipitor 80 mg oral tablet: 1 tab(s) orally once a day  metFORMIN 500 mg oral tablet: 1 tab(s) orally 2 times a day  metoprolol tartrate 25 mg oral tablet: 1 tab(s) orally 2 times a day  metoprolol tartrate 50 mg oral tablet: 1 tab(s) orally 2 times a day  mirtazapine 7.5 mg oral tablet: 1 tab(s) orally once a day (at bedtime)  mirtazapine 7.5 mg oral tablet: 1 tab(s) orally once a day   Norvasc 10 mg oral tablet: 1 tab(s) orally once a day  PARoxetine 10 mg oral tablet: 1 tab(s) orally once a day  PARoxetine 10 mg oral tablet: 1 tab(s) orally once a day  Pepcid 20 mg oral tablet: 1 tab(s) orally once a day  Vitamin C 500 mg oral tablet: 1 tab(s) orally once a day   aspirin 81 mg oral tablet, chewable: 1 tab(s) orally once a day  atorvastatin 80 mg oral tablet: 1 tab(s) orally once a day (at bedtime)  clopidogrel 75 mg oral tablet: 1 tab(s) orally once a day  hydrALAZINE 25 mg oral tablet: 1 tab(s) orally 2 times a day  Lantus 100 units/mL subcutaneous solution: 13 unit(s) subcutaneous once a day  metoprolol tartrate 50 mg oral tablet: 1 tab(s) orally 2 times a day  mirtazapine 7.5 mg oral tablet: 1 tab(s) orally once a day   Norvasc 10 mg oral tablet: 1 tab(s) orally once a day  PARoxetine 10 mg oral tablet: 1 tab(s) orally once a day  Pepcid 20 mg oral tablet: 1 tab(s) orally once a day  Vitamin C 500 mg oral tablet: 1 tab(s) orally once a day

## 2023-03-14 NOTE — DISCHARGE NOTE PROVIDER - NSDCCPCAREPLAN_GEN_ALL_CORE_FT
PRINCIPAL DISCHARGE DIAGNOSIS  Diagnosis: Unstable angina  Assessment and Plan of Treatment: Cath performed on 3/14/2023 that demonstrates 80% stenosis of RCA with mild to moderate stenosis of LAD. Patient should continue Aspirin and Plavix. Follow up with cardiology as outpatient.      SECONDARY DISCHARGE DIAGNOSES  Diagnosis: Chronic kidney disease, unspecified CKD stage  Assessment and Plan of Treatment: Stage 3. At baseline. [place] outpatient management.    Diagnosis: HTN (hypertension)  Assessment and Plan of Treatment: Continue outpatient regimen.    Diagnosis: Anemia of chronic disease  Assessment and Plan of Treatment: Hemoglobin 9.9, previously 10.5    Diagnosis: History of CVA (cerebrovascular accident)  Assessment and Plan of Treatment: No new neurological complaints, continue outpatient medications.    Diagnosis: Type 2 diabetes mellitus  Assessment and Plan of Treatment: Continue outpatient medications. Discontinue oral medications, continue insulin.    Diagnosis: Hyperlipidemia  Assessment and Plan of Treatment: Continue outpatient statin therapy.     PRINCIPAL DISCHARGE DIAGNOSIS  Diagnosis: Unstable angina  Assessment and Plan of Treatment: Cath performed on 3/14/2023 that demonstrates 80% stenosis of RCA (stented) with mild to moderate stenosis of LAD. Patient should continue Aspirin and Plavix. Follow up with cardiology as outpatient.      SECONDARY DISCHARGE DIAGNOSES  Diagnosis: Chronic kidney disease, unspecified CKD stage  Assessment and Plan of Treatment: Stage 3. At baseline. Outpatient follow up with Nephrology  Avoid nephrotoxic drugs    Diagnosis: HTN (hypertension)  Assessment and Plan of Treatment: Continue current  regimen.    Diagnosis: Anemia of chronic disease  Assessment and Plan of Treatment: Hemoglobin 9.9, previously 10.5  Follow up with GI/PMD for anemia workup as outpatient    Diagnosis: History of CVA (cerebrovascular accident)  Assessment and Plan of Treatment: No new neurological complaints, continue outpatient medications.    Diagnosis: Type 2 diabetes mellitus  Assessment and Plan of Treatment: Discontinue oral medications, continue insulin. Follow up with Endocrine.   With a diagnosis of diabetes comes a strict diet regimen to help control blood sugars by watching carbohydrate consumption. Carbohydrates, such as starches, fruits, dairy and starchy vegetables, is the food group that affects glucose levels in the body. Carefully monitoring carbohydrate intake is a main component of the diabetic diet; eating three meals each day that are roughly equivalent in size can help control blood sugars. A registered dietitian can help you plan a diet customized to your individual needs.      Diagnosis: Hyperlipidemia  Assessment and Plan of Treatment: Continue outpatient statin therapy.

## 2023-03-14 NOTE — DISCHARGE NOTE PROVIDER - ATTENDING DISCHARGE PHYSICAL EXAMINATION:
aaox3 nad   rrr s1s2  ctab   soft abdomen  no LE edema  nonfocal neuro  ambulatory. feels well ros negative  advised outpatient follow up for renal/endo and gi (need egd/colonoscopy)

## 2023-03-14 NOTE — DISCHARGE NOTE PROVIDER - NSDCQMAMI_CARD_ALL_CORE
[facility]    cc:         Susan Berry M.D.              Mavis Burr M.D.    SURGEON:  SUSAN BERRY M.D.    ASSISTANT:  DR. MAVIS BURR.    PREOPERATIVE DIAGNOSIS:  LARGE RIGHT L4-5 SYNOVIAL CYST CAUSING SPINAL STENOSIS AND RIGHT L5 NERVE ROOT COMPRESSION.    POSTOPERATIVE DIAGNOSIS:  Large right L4-5 synovial cyst causing spinal stenosis and right L5 nerve root compression.    PROCEDURE:  1. A RIGHT L4-5 HEMILAMINOTOMY.  2. EXCISION OF SYNOVIAL CYST.  3. USE OF THE OPERATING MICROSCOPE.    ESTIMATED BLOOD LOSS:  Negligible.    COMPLICATIONS:  None.    DESCRIPTION OF PROCEDURE:  The patient was brought to the operating room, intubated in his bed, turned to prone position on the Bud frame.  Care was taken to pad the upper and lower extremities and decompress the abdomen.  The lumbar spine was prepped and draped in the usual sterile fashion.  Intraoperative fluoroscopy was used to identify the appropriate level of skin incision with a spinal needle.  The opening incision was keyed off this.  A 1-inch length incision  was placed along the lumbar spine at the L4-5 level.  The fascia was divided along the length of the incision.  Rodriguez elevator was used to gain the exposure to the lamina and a Paula retractor was placed deep in the wound.     Next, the fluoroscopy was brought back in to confirm the level.  Next, an inferomedial facetectomy of L4 and a superomedial facetectomy of L5 were performed to gain access to the spinal canal.  On entering the facet joint, yellow fluid escaped, consistent with synovial cyst fluid.  Once the hemilaminotomy was performed, the spinal canal was entered and ligamentum flavum was removed.  An immediate association with the ligamentum was identified a  gelatinous synovial cyst.  This was removed in a piecemeal fashion.  The lateral aspect of the thecal sac and the right L5 nerve root were well visualized.  The cyst was removed 
----- Message from JOSE Schrader sent at 2/27/2023  7:49 AM CST -----  UTI, offer virtual visit today.     Thank you, Delfina   
Left vm to return call. Please help pt schedule virtual visit.   
Patient is scheduled for 2:00  
extensively from the superior aspect of L5 to the inferior laminar aspect of L4.  At the conclusion, the thecal sac and nerve roots were felt to be well decompressed.  The wound was closed in layers with #1 Vicryl for the deep fascia, 2-0 for the subcutaneous tissue,  and a subcuticular stitch for the skin.  The patient tolerated the surgery well without complication.  The sponge and needle count was correct at the end of the case.    _______________________________________TOMASA BRADLEY M.D.                    MRN#:  [id number]                                         ACCT#:  [id number]                                         DATE OF SURGERY:  0                                         4/19/2019                                           ROOM:                                            [facility]  DICTATED BY: Raphael Berry M.D.  DD: 04/19/2019 DT: 04/19/2019 TD: 08:42 TT: 09:01 K#: [id number]                           REPORT OF OPERATION  
No

## 2023-03-14 NOTE — PROGRESS NOTE ADULT - SUBJECTIVE AND OBJECTIVE BOX
[facility] Division of Hospital Medicine    Chief Complaint:  Exertional CP    SUBJECTIVE / OVERNIGHT EVENTS:  Admitted overnight, patient seen at bedside, in NAD, endorses resolution in CP, denies SOB, abd pain, N/V, fever, chills, dysuria or any other complaints. All remainder ROS negative.     MEDICATIONS  (STANDING):  acetylcysteine  Oral Solution 1200 milliGRAM(s) Oral every 12 hours  amLODIPine   Tablet 10 milliGRAM(s) Oral daily  aspirin enteric coated 325 milliGRAM(s) Oral daily  atorvastatin 80 milliGRAM(s) Oral at bedtime  dextrose 5%. 1000 milliLiter(s) (50 mL/Hr) IV Continuous <Continuous>  dextrose 5%. 1000 milliLiter(s) (100 mL/Hr) IV Continuous <Continuous>  dextrose 50% Injectable 25 Gram(s) IV Push once  dextrose 50% Injectable 12.5 Gram(s) IV Push once  dextrose 50% Injectable 25 Gram(s) IV Push once  famotidine    Tablet 20 milliGRAM(s) Oral daily  glucagon  Injectable 1 milliGRAM(s) IntraMuscular once  insulin glargine Injectable (LANTUS) 13 Unit(s) SubCutaneous at bedtime  insulin lispro (ADMELOG) corrective regimen sliding scale   SubCutaneous every 6 hours  metoprolol tartrate 50 milliGRAM(s) Oral two times a day  PARoxetine 10 milliGRAM(s) Oral daily  sodium chloride 0.45%. 1000 milliLiter(s) (100 mL/Hr) IV Continuous <Continuous>  sodium chloride 0.9% lock flush 3 milliLiter(s) IV Push every 8 hours    MEDICATIONS  (PRN):  acetaminophen     Tablet .. 650 milliGRAM(s) Oral every 6 hours PRN Temp greater or equal to 38C (100.4F), Mild Pain (1 - 3)  aluminum hydroxide/magnesium hydroxide/simethicone Suspension 30 milliLiter(s) Oral every 4 hours PRN Dyspepsia  dextrose Oral Gel 15 Gram(s) Oral once PRN Blood Glucose LESS THAN 70 milliGRAM(s)/deciliter  melatonin 3 milliGRAM(s) Oral at bedtime PRN Insomnia  nitroglycerin     SubLingual 0.4 milliGRAM(s) SubLingual every 5 minutes PRN Chest Pain  ondansetron Injectable 4 milliGRAM(s) IV Push every 8 hours PRN Nausea and/or Vomiting        I&O's Summary      PHYSICAL EXAM:  Vital Signs Last 24 Hrs  T(C): 36.4 (14 Mar 2023 07:38), Max: 36.9 (13 Mar 2023 20:14)  T(F): 97.6 (14 Mar 2023 07:38), Max: 98.5 (13 Mar 2023 20:14)  HR: 68 (14 Mar 2023 07:38) (64 - 86)  BP: 135/80 (14 Mar 2023 07:38) (127/78 - 178/83)  BP(mean): 116 (14 Mar 2023 03:08) (94 - 116)  RR: 18 (14 Mar 2023 07:38) (16 - 18)  SpO2: 97% (14 Mar 2023 07:38) (97% - 100%)    Parameters below as of 14 Mar 2023 07:38  Patient On (Oxygen Delivery Method): room air  O2 Flow (L/min): 20    PHYSICAL EXAM:    General: in no acute distress  Eyes: PERRLA, EOMI; conjunctiva and sclera clear  Head: Normocephalic; atraumatic  ENMT: No nasal discharge; airway clear  Neck: Supple; non tender; no masses  Respiratory: No wheezes, rales or rhonchi  Cardiovascular: Regular rate and rhythm. S1 and S2 Normal; No murmurs, gallops or rubs  Gastrointestinal: Soft non-tender non-distended; Normal bowel sounds  Genitourinary: No costovertebral angle tenderness  Extremities: Normal range of motion, No clubbing, cyanosis or edema  Vascular: Peripheral pulses palpable 2+ bilaterally  Neurological: Alert and oriented x4  Skin: Warm and dry. No acute rash  Lymph Nodes: No acute cervical adenopathy  Musculoskeletal: Normal gait, tone, without deformities  Psychiatric: Cooperative and appropriate    LABS:                        9.7    5.63  )-----------( 271      ( 14 Mar 2023 06:28 )             27.4     03-14    137  |  102  |  17.4  ----------------------------<  176<H>  3.6   |  27.0  |  1.93<H>    Ca    8.4      14 Mar 2023 06:28    TPro  6.7  /  Alb  3.2<L>  /  TBili  0.3<L>  /  DBili  x   /  AST  18  /  ALT  16  /  AlkPhos  128<H>  03-13    PT/INR - ( 13 Mar 2023 22:00 )   PT: 11.8 sec;   INR: 1.02 ratio         PTT - ( 13 Mar 2023 22:00 )  PTT:26.6 sec  CARDIAC MARKERS ( 14 Mar 2023 06:28 )  x     / <0.01 ng/mL / x     / x     / x      CARDIAC MARKERS ( 13 Mar 2023 22:00 )  x     / <0.01 ng/mL / x     / x     / x              CAPILLARY BLOOD GLUCOSE      POCT Blood Glucose.: 146 mg/dL (14 Mar 2023 05:32)        RADIOLOGY & ADDITIONAL TESTS:  Results Reviewed: y  Imaging Personally Reviewed: n  Electrocardiogram Personally Reviewed: marlene

## 2023-03-14 NOTE — DISCHARGE NOTE PROVIDER - PROVIDER TOKENS
PROVIDER:[TOKEN:[[id number]],FOLLOWUP:[2 weeks],ESTABLISHEDPATIENT:[T]] PROVIDER:[TOKEN:[[id number]],FOLLOWUP:[2 weeks],ESTABLISHEDPATIENT:[T]],PROVIDER:[TOKEN:[[id number]]],PROVIDER:[TOKEN:[[id number]]],PROVIDER:[TOKEN:[[id number]]]

## 2023-03-14 NOTE — PATIENT PROFILE ADULT - FALL HARM RISK - UNIVERSAL INTERVENTIONS
Bed in lowest position, wheels locked, appropriate side rails in place/Call bell, personal items and telephone in reach/Instruct patient to call for assistance before getting out of bed or chair/Non-slip footwear when patient is out of bed/Chana to call system/Physically safe environment - no spills, clutter or unnecessary equipment/Purposeful Proactive Rounding/Room/bathroom lighting operational, light cord in reach

## 2023-03-14 NOTE — DISCHARGE NOTE PROVIDER - HOSPITAL COURSE
53 y/o male with hx of CVA x2 in 10/21 with residual right sided weakness/sensory loss, no dysphagia or use of assistive devices, HTN, HLD, DM-2, CKD-3. Patient was sent into for admission by his Cardiologist after he presented there c/o worsening exertional CP associated with SOB that is better with rest.  EKG in office with inferolateral T wave inversions which are new from prior. Patient denies any cardiac history, no fever, chills, N/V. He denies any changes in medications. In ED repeat EKG confirmed changes seen in Cardiology office. Vitals stable, Trop neg. RHC performed on 3/14/2023 without complication, procedure reveals Mild to moderate stenosis of the mid LAD   Normal Ramus and LCX with 80% stenosis of the proximal RCA, successfully stented. Patient recommended cardiac rehab. Patient medically cleared for discharge. 53 y/o male with hx of CVA x2 in 10/21 with residual right sided weakness/sensory loss, no dysphagia or use of assistive devices, HTN, HLD, DM-2, CKD-3. Patient was sent  for admission by his Cardiologist after he presented there c/o worsening exertional CP associated with SOB that is better with rest.  EKG in office with inferolateral T wave inversions which are new from prior.  In ED repeat EKG confirmed changes seen in Cardiology office. Vitals stable, Trop neg. LHC performed on 3/14/2023 without complication, procedure reveals Mild to moderate stenosis of the mid LAD ,Normal Ramus and LCX with 80% stenosis of the proximal RCA, successfully stented. Patient recommended cardiac rehab. Patient HGAic 9.6, recommend continue insulin and outpatient follow up with Endocrine. Patient will also need to follow up with GI for anemia workup as outpatient and Nephrology for CKD stage 3.  Patient medically cleared for discharge.

## 2023-03-14 NOTE — DISCHARGE NOTE PROVIDER - NSDCCPTREATMENT_GEN_ALL_CORE_FT
PRINCIPAL PROCEDURE  Procedure: Coronary stent placement  Findings and Treatment: Restricted use with no heavy lifting of affected arm for 48 hours.  No submerging the arm in water for 48 hours.  You may start showering today.  Call your doctor for any bleeding, swelling, loss of sensation in the hand or fingers, or fingers turning blue.  If heavy bleeding or large lumps form, hold pressure at the spot and come to the Emergency Room.

## 2023-03-14 NOTE — CONSULT NOTE ADULT - SUBJECTIVE AND OBJECTIVE BOX
Patient is a 52y old  Male who presents with a chief complaint of Exertional Chest pain        HPI:  51 y/o male with hx of CVA x2 in 10/21 with residual right sided weakness/sensory loss, no dysphagia or use of assistive devices, HTN, HLD, DM-2, CKD-3. Patient was sent to ER by his Cardiologist after he presented there c/o worsening exertional CP associated with SOB that is better with rest.  EKG in office with inferolateral T wave inversions which are new from prior. Patient denies any cardiac history, no fever, chills, N/V. He denies any changes in medications. In ED repeat EKG confirmed changes seen in Cardiology office.    PAST MEDICAL & SURGICAL HISTORY:  Primary hypertension  H/O Marshall palsy  Hyperlipemia  Type 2 diabetes mellitus  CVA (cerebrovascular accident)  HTN (hypertension)  HLD (hyperlipidemia)  Diabetes mellitus  No significant past surgical history          Allergies    No Known Drug Allergies  shellfish (Anaphylaxis)  shellfish (Short breath)            MEDICATIONS  (STANDING):  acetylcysteine  Oral Solution 1200 milliGRAM(s) Oral every 12 hours  amLODIPine   Tablet 10 milliGRAM(s) Oral daily  aspirin enteric coated 325 milliGRAM(s) Oral daily  atorvastatin 80 milliGRAM(s) Oral at bedtime  dextrose 5%. 1000 milliLiter(s) (50 mL/Hr) IV Continuous <Continuous>  dextrose 5%. 1000 milliLiter(s) (100 mL/Hr) IV Continuous <Continuous>  dextrose 50% Injectable 25 Gram(s) IV Push once  dextrose 50% Injectable 12.5 Gram(s) IV Push once  dextrose 50% Injectable 25 Gram(s) IV Push once  diphenhydrAMINE Injectable 50 milliGRAM(s) IV Push once  famotidine    Tablet 20 milliGRAM(s) Oral daily  glucagon  Injectable 1 milliGRAM(s) IntraMuscular once  hydrocortisone sodium succinate Injectable 125 milliGRAM(s) IV Push once  insulin glargine Injectable (LANTUS) 13 Unit(s) SubCutaneous at bedtime  insulin lispro (ADMELOG) corrective regimen sliding scale   SubCutaneous every 6 hours  metoprolol tartrate 50 milliGRAM(s) Oral two times a day  PARoxetine 10 milliGRAM(s) Oral daily  sodium chloride 0.45%. 1000 milliLiter(s) (100 mL/Hr) IV Continuous <Continuous>  sodium chloride 0.9% lock flush 3 milliLiter(s) IV Push every 8 hours  sodium chloride 0.9%. 1000 milliLiter(s) (75 mL/Hr) IV Continuous <Continuous>      FAMILY HISTORY:  FH: HTN (hypertension) (Father, Mother)    CIGARETTES: NEVER    ALCOHOL: DENIES        REVIEW OF SYSTEMS:  CONSTITUTIONAL: No fever, weight loss, or fatigue  EYES: No eye pain, visual disturbances, or discharge  ENMT:  No difficulty hearing, tinnitus, vertigo; No sinus or throat pain  NECK: No pain or stiffness  RESPIRATORY: No cough, wheezing, chills or hemoptysis; + Shortness of Breath on exertion  CARDIOVASCULAR: + chest pain on exertion, palpitations, passing out, dizziness, or leg swelling  GASTROINTESTINAL: No abdominal or epigastric pain. No nausea, vomiting, or hematemesis; No diarrhea or constipation. No melena or hematochezia.  GENITOURINARY: No dysuria, frequency, hematuria, or incontinence  NEUROLOGICAL: No headaches, memory loss, loss of strength, or Tremors,  Residual Right sided numbness due to old CVA.  SKIN: No itching, burning, rashes, or lesions   ENDOCRINE: No heat or cold intolerance; No hair loss  MUSCULOSKELETAL: No joint pain or swelling; No muscle, back, or extremity pain  PSYCHIATRIC: No depression, anxiety, mood swings, or difficulty sleeping  HEME/LYMPH: No easy bruising, or bleeding gums  ALLERY AND IMMUNOLOGIC: No hives or eczema	    Vital Signs Last 24 Hrs  T(C): 36.4 (14 Mar 2023 07:38), Max: 36.9 (13 Mar 2023 20:14)  T(F): 97.6 (14 Mar 2023 07:38), Max: 98.5 (13 Mar 2023 20:14)  HR: 79 (14 Mar 2023 16:20) (64 - 86)  BP: 153/83 (14 Mar 2023 16:20) (127/78 - 178/83)  BP(mean): 116 (14 Mar 2023 03:08) (94 - 116)  RR: 16 (14 Mar 2023 16:20) (16 - 18)  SpO2: 100% (14 Mar 2023 16:20) (97% - 100%)    Parameters below as of 14 Mar 2023 16:20  Patient On (Oxygen Delivery Method): room air          PHYSICAL EXAM:  Appearance: Normal appearance	  HEENT:   Normal oral mucosa, PERRL, EOMI, sclera non-icteric	  Cardiovascular: Normal S1 S2, No JVD, No cardiac murmurs, No carotid bruits, No peripheral edema  Respiratory: Lungs clear to auscultation	  Psychiatry: A & O x 3, Mood & affect appropriate  Gastrointestinal:  Soft, Non-tender, + BS, no bruits	  Skin: No rashes, No ecchymoses, No cyanosis  Neurologic: Grossly non-focal with full strength in all four extremities  Extremities: Normal range of motion, No clubbing, cyanosis or edema  Vascular: Peripheral pulses palpable  bilaterally      ECG: Sinus Rhythm, Diffuse T-wave inversions            LABS:                        9.7    5.63  )-----------( 271      ( 14 Mar 2023 06:28 )             27.4     03-14    137  |  102  |  17.4  ----------------------------<  176<H>  3.6   |  27.0  |  1.93<H>    Ca    8.4      14 Mar 2023 06:28    TPro  6.7  /  Alb  3.2<L>  /  TBili  0.3<L>  /  DBili  x   /  AST  18  /  ALT  16  /  AlkPhos  128<H>  03-13    CARDIAC MARKERS ( 14 Mar 2023 06:28 )  x     / <0.01 ng/mL / x     / x     / x      CARDIAC MARKERS ( 13 Mar 2023 22:00 )  x     / <0.01 ng/mL / x     / x     / x          PT/INR - ( 13 Mar 2023 22:00 )   PT: 11.8 sec;   INR: 1.02 ratio         PTT - ( 13 Mar 2023 22:00 )  PTT:26.6 sec    I&O's Summary    BNP  RADIOLOGY & ADDITIONAL STUDIES:    Assessment:  HPI:  51 y/o male with hx of CVA x2 in 10/21 with residual right sided weakness/sensory loss, no dysphagia or use of assistive devices, HTN, HLD, DM-2, CKD-3. Patient was sent to ER by his Cardiologist after he presented there c/o worsening exertional CP associated with SOB that is better with rest.  EKG in office with inferolateral T wave inversions which are new from prior. Patient denies any cardiac history, no fever, chills, N/V. He denies any changes in medications. In ED repeat EKG confirmed changes seen in Cardiology office.  Due to Pt's current symptoms, significant cardiac risk factors and ECG changes, I am recommending Left heart cardiac cath for further cardiac evaluation.    Plan:  Left heart cardiac catheterization and possible percutaneous intervention recommended.  Risks, benefits, and alternatives reviewed.  Risks including but not limited to MI, death, stroke, bleeding, infection, vessel injury, hematoma, renal failure, allergic reaction, urgent open heart surgery, restenosis and stent thrombosis were reviewed.  All questions answered.  Patient is agreeable to proceed.

## 2023-03-14 NOTE — PATIENT PROFILE ADULT - ..
Add 59745 Cpt? (Important Note: In 2017 The Use Of 61096 Is Being Tracked By Cms To Determine Future Global Period Reimbursement For Global Periods): yes
Detail Level: Detailed
14-Mar-2023 03:38:09

## 2023-03-14 NOTE — DISCHARGE NOTE PROVIDER - CARE PROVIDERS DIRECT ADDRESSES
[email or web address]

## 2023-03-14 NOTE — PROGRESS NOTE ADULT - ASSESSMENT
53 y/o male with exertional CP, New EKG changes, Hx of CVA w/ right sided residual weakness/sensory deficits, HTN, HLD, DM-2, CKD-3    Exertional CP, now resolved:  -No ACS at this time, trop neg  -Vitals stable  -Cont. Aspirin, statin, BB  -Check A1c/FLP  -Tele monitor  -NPO except meds, LHC scheduled for today  -Mucomyst for contrast nephropathy prophylaxis, maintain euvolemia  -c/w IVF to help prevent DIRK  -VC boots, ambulate as tolerated     CVA old:  -No new neurologic complaints reported  -Cont. o/p regimen    HTN:  -Cont. o/p regimen  -DASH diet    HLD:  -Statin  -check FLP    DM-2:  -Accu checks Q 6 hours while npo  -Lantus  -ADA diet when no longer NPO  -Check A1c     CKD-3:  -At baseline  -Avoid nephrotoxins, renally dose meds  -High risk for DIRK with LHC planned, monitor renal fxn   -IVF, Mucomyst PO    Hx of Asthma  - Not in exacerbation  - duonebs PRN    Healthcare maintenance  DVT PPx - SCDs  Dispo - Pending LHC    Discussed with Patient,

## 2023-03-15 ENCOUNTER — TRANSCRIPTION ENCOUNTER (OUTPATIENT)
Age: 53
End: 2023-03-15

## 2023-03-15 VITALS
TEMPERATURE: 99 F | DIASTOLIC BLOOD PRESSURE: 77 MMHG | RESPIRATION RATE: 18 BRPM | SYSTOLIC BLOOD PRESSURE: 136 MMHG | OXYGEN SATURATION: 100 % | HEART RATE: 78 BPM

## 2023-03-15 LAB
ANION GAP SERPL CALC-SCNC: 10 MMOL/L — SIGNIFICANT CHANGE UP (ref 5–17)
BUN SERPL-MCNC: 19.3 MG/DL — SIGNIFICANT CHANGE UP (ref 8–20)
CALCIUM SERPL-MCNC: 8.3 MG/DL — LOW (ref 8.4–10.5)
CHLORIDE SERPL-SCNC: 103 MMOL/L — SIGNIFICANT CHANGE UP (ref 96–108)
CO2 SERPL-SCNC: 25 MMOL/L — SIGNIFICANT CHANGE UP (ref 22–29)
CREAT SERPL-MCNC: 2.2 MG/DL — HIGH (ref 0.5–1.3)
EGFR: 35 ML/MIN/1.73M2 — LOW
GLUCOSE BLDC GLUCOMTR-MCNC: 138 MG/DL — HIGH (ref 70–99)
GLUCOSE SERPL-MCNC: 198 MG/DL — HIGH (ref 70–99)
HCT VFR BLD CALC: 28.1 % — LOW (ref 39–50)
HGB BLD-MCNC: 9.9 G/DL — LOW (ref 13–17)
MCHC RBC-ENTMCNC: 28.9 PG — SIGNIFICANT CHANGE UP (ref 27–34)
MCHC RBC-ENTMCNC: 35.2 GM/DL — SIGNIFICANT CHANGE UP (ref 32–36)
MCV RBC AUTO: 82.2 FL — SIGNIFICANT CHANGE UP (ref 80–100)
PLATELET # BLD AUTO: 278 K/UL — SIGNIFICANT CHANGE UP (ref 150–400)
POTASSIUM SERPL-MCNC: 3.9 MMOL/L — SIGNIFICANT CHANGE UP (ref 3.5–5.3)
POTASSIUM SERPL-SCNC: 3.9 MMOL/L — SIGNIFICANT CHANGE UP (ref 3.5–5.3)
RBC # BLD: 3.42 M/UL — LOW (ref 4.2–5.8)
RBC # FLD: 12.8 % — SIGNIFICANT CHANGE UP (ref 10.3–14.5)
SODIUM SERPL-SCNC: 138 MMOL/L — SIGNIFICANT CHANGE UP (ref 135–145)
WBC # BLD: 7.3 K/UL — SIGNIFICANT CHANGE UP (ref 3.8–10.5)
WBC # FLD AUTO: 7.3 K/UL — SIGNIFICANT CHANGE UP (ref 3.8–10.5)

## 2023-03-15 PROCEDURE — C1753: CPT

## 2023-03-15 PROCEDURE — 80048 BASIC METABOLIC PNL TOTAL CA: CPT

## 2023-03-15 PROCEDURE — C1769: CPT

## 2023-03-15 PROCEDURE — C1894: CPT

## 2023-03-15 PROCEDURE — 87637 SARSCOV2&INF A&B&RSV AMP PRB: CPT

## 2023-03-15 PROCEDURE — C1874: CPT

## 2023-03-15 PROCEDURE — 93005 ELECTROCARDIOGRAM TRACING: CPT

## 2023-03-15 PROCEDURE — 85730 THROMBOPLASTIN TIME PARTIAL: CPT

## 2023-03-15 PROCEDURE — 82962 GLUCOSE BLOOD TEST: CPT

## 2023-03-15 PROCEDURE — 93010 ELECTROCARDIOGRAM REPORT: CPT

## 2023-03-15 PROCEDURE — 83036 HEMOGLOBIN GLYCOSYLATED A1C: CPT

## 2023-03-15 PROCEDURE — C1887: CPT

## 2023-03-15 PROCEDURE — 85610 PROTHROMBIN TIME: CPT

## 2023-03-15 PROCEDURE — 80061 LIPID PANEL: CPT

## 2023-03-15 PROCEDURE — 85027 COMPLETE CBC AUTOMATED: CPT

## 2023-03-15 PROCEDURE — 93458 L HRT ARTERY/VENTRICLE ANGIO: CPT | Mod: 59

## 2023-03-15 PROCEDURE — 92978 ENDOLUMINL IVUS OCT C 1ST: CPT | Mod: RC

## 2023-03-15 PROCEDURE — 99239 HOSP IP/OBS DSCHRG MGMT >30: CPT

## 2023-03-15 PROCEDURE — 84484 ASSAY OF TROPONIN QUANT: CPT

## 2023-03-15 PROCEDURE — C9600: CPT | Mod: RC

## 2023-03-15 PROCEDURE — 85025 COMPLETE CBC W/AUTO DIFF WBC: CPT

## 2023-03-15 PROCEDURE — C1725: CPT

## 2023-03-15 PROCEDURE — 80053 COMPREHEN METABOLIC PANEL: CPT

## 2023-03-15 PROCEDURE — 36415 COLL VENOUS BLD VENIPUNCTURE: CPT

## 2023-03-15 PROCEDURE — 71045 X-RAY EXAM CHEST 1 VIEW: CPT

## 2023-03-15 PROCEDURE — 99285 EMERGENCY DEPT VISIT HI MDM: CPT | Mod: 25

## 2023-03-15 RX ORDER — CLOPIDOGREL BISULFATE 75 MG/1
1 TABLET, FILM COATED ORAL
Qty: 30 | Refills: 0
Start: 2023-03-15 | End: 2023-04-13

## 2023-03-15 RX ORDER — INSULIN GLARGINE 100 [IU]/ML
13 INJECTION, SOLUTION SUBCUTANEOUS
Qty: 0 | Refills: 0 | DISCHARGE

## 2023-03-15 RX ORDER — ASCORBIC ACID 60 MG
1 TABLET,CHEWABLE ORAL
Qty: 0 | Refills: 0 | DISCHARGE

## 2023-03-15 RX ORDER — ASPIRIN/CALCIUM CARB/MAGNESIUM 324 MG
1 TABLET ORAL
Qty: 30 | Refills: 0
Start: 2023-03-15 | End: 2023-04-13

## 2023-03-15 RX ORDER — MIRTAZAPINE 45 MG/1
1 TABLET, ORALLY DISINTEGRATING ORAL
Qty: 0 | Refills: 0 | DISCHARGE

## 2023-03-15 RX ORDER — GLIMEPIRIDE 1 MG
1 TABLET ORAL
Qty: 0 | Refills: 0 | DISCHARGE

## 2023-03-15 RX ORDER — HYDRALAZINE HCL 50 MG
1 TABLET ORAL
Qty: 60 | Refills: 0
Start: 2023-03-15 | End: 2023-04-13

## 2023-03-15 RX ORDER — ATORVASTATIN CALCIUM 80 MG/1
1 TABLET, FILM COATED ORAL
Qty: 0 | Refills: 0 | DISCHARGE

## 2023-03-15 RX ORDER — ASPIRIN/CALCIUM CARB/MAGNESIUM 324 MG
1 TABLET ORAL
Qty: 0 | Refills: 0 | DISCHARGE

## 2023-03-15 RX ORDER — FAMOTIDINE 10 MG/ML
1 INJECTION INTRAVENOUS
Qty: 0 | Refills: 0 | DISCHARGE

## 2023-03-15 RX ORDER — METOPROLOL TARTRATE 50 MG
1 TABLET ORAL
Qty: 0 | Refills: 0 | DISCHARGE

## 2023-03-15 RX ORDER — AMLODIPINE BESYLATE 2.5 MG/1
1 TABLET ORAL
Qty: 0 | Refills: 0 | DISCHARGE

## 2023-03-15 RX ORDER — INSULIN LISPRO 100/ML
VIAL (ML) SUBCUTANEOUS
Refills: 0 | Status: DISCONTINUED | OUTPATIENT
Start: 2023-03-15 | End: 2023-03-15

## 2023-03-15 RX ORDER — HYDRALAZINE HCL 50 MG
25 TABLET ORAL
Refills: 0 | Status: DISCONTINUED | OUTPATIENT
Start: 2023-03-15 | End: 2023-03-15

## 2023-03-15 RX ADMIN — SODIUM CHLORIDE 3 MILLILITER(S): 9 INJECTION INTRAMUSCULAR; INTRAVENOUS; SUBCUTANEOUS at 05:34

## 2023-03-15 RX ADMIN — Medication 1200 MILLIGRAM(S): at 05:21

## 2023-03-15 RX ADMIN — AMLODIPINE BESYLATE 10 MILLIGRAM(S): 2.5 TABLET ORAL at 05:21

## 2023-03-15 RX ADMIN — SODIUM CHLORIDE 75 MILLILITER(S): 9 INJECTION INTRAMUSCULAR; INTRAVENOUS; SUBCUTANEOUS at 00:16

## 2023-03-15 RX ADMIN — Medication 50 MILLIGRAM(S): at 05:21

## 2023-03-15 RX ADMIN — Medication 650 MILLIGRAM(S): at 06:45

## 2023-03-15 NOTE — PROGRESS NOTE ADULT - ASSESSMENT
51 yo male with HTN, HLD, DM2, CKD3, CVA x2 on ASA with residual rt sided numbness admitted with   1. Chest pain, exertional/unstable angina, precordial T wave inversions, trop neg x2  2. s/p cath (03/14/2023) s/p RCA MYRNA for 80% stenosis, residual mild to moderate stenosis in mid LAD  3. CKD  4. Uncontrolled HTN, control improved  5. Anemia, Hgb 10.5 -> 9.9      Plan:  Continue aspirin and plavix.   Continue amlodipine and metoprolol  Add hydralazine 25 mg po q 12 hours  Repeat EKG  Anemia evaluation prior to discharge.

## 2023-03-15 NOTE — PROGRESS NOTE ADULT - ASSESSMENT
Procedure: LHC and PCI of the pRCA    1. S/P LHC and PCI of the pRCA  Post procedure instructions explained.  Medications:        DAPT: Plavix 75 mg daily and aspirin 81 mg daily       Statin:        Beta Blocker:        RAAS Inhibition:        Other Medications:   Cardiac rehab info provided/referral and communication to cardiac rehab completed  Aggressive lifestyle modification.    2.     Discharge Planning:   Follow up with:     Procedure: LHC and PCI of the pRCA    1. S/P LHC and PCI of the pRCA  ·	Post procedure instructions explained.  ·	Medications:   ·	     DAPT: Plavix 75 mg daily and aspirin 81 mg daily  ·	     Statin: Lipitor 80 mg daily  ·	     Beta Blocker: Metoprolol 50 mg BID  ·	     RAAS Inhibition: Contraindicated in increased creatinine  ·	     Other Medications: N/A  ·	Cardiac rehab info provided/referral and communication to cardiac rehab completed  ·	Aggressive lifestyle modification.    2. HLD  ·	Goal Non-HDL < 80, LDL < 55  ·	Lipid Panel: Not at goal  ·	Statin: Lipitor 80 mg daily started  ·	Other: N/A    3. HTN  ·	BP Range Last 24 Hours: 135-156/  ·	Beta Blocker: Metoprolol 50 mg BID  ·	RAASi: Contraindicated in increased creatinine  ·	CCB: Amlodipine 10 mg daily  ·	Other: Hydralazine 25 mg BID    Discharge Planning:   ·	Follow up with: [facility]

## 2023-03-15 NOTE — DISCHARGE NOTE NURSING/CASE MANAGEMENT/SOCIAL WORK - NSDCPEFALRISK_GEN_ALL_CORE
For information on Fall & Injury Prevention, visit: [email or web address] OR  [email or web address] OR  https://www.cdc.gov/steadi/patient.html

## 2023-03-15 NOTE — PROGRESS NOTE ADULT - REASON FOR ADMISSION
Exertional CP  ischemic eval

## 2023-03-15 NOTE — PROGRESS NOTE ADULT - SUBJECTIVE AND OBJECTIVE BOX
He denies chest pain or dyspnea.     TELEMETRY: sr    MEDICATIONS  (STANDING):  acetylcysteine  Oral Solution 1200 milliGRAM(s) Oral every 12 hours  amLODIPine   Tablet 10 milliGRAM(s) Oral daily  aspirin  chewable 81 milliGRAM(s) Oral daily  atorvastatin 80 milliGRAM(s) Oral at bedtime  clopidogrel Tablet 75 milliGRAM(s) Oral daily  dextrose 5%. 1000 milliLiter(s) (100 mL/Hr) IV Continuous <Continuous>  dextrose 5%. 1000 milliLiter(s) (50 mL/Hr) IV Continuous <Continuous>  dextrose 50% Injectable 25 Gram(s) IV Push once  dextrose 50% Injectable 12.5 Gram(s) IV Push once  dextrose 50% Injectable 25 Gram(s) IV Push once  famotidine    Tablet 20 milliGRAM(s) Oral daily  glucagon  Injectable 1 milliGRAM(s) IntraMuscular once  insulin glargine Injectable (LANTUS) 13 Unit(s) SubCutaneous at bedtime  insulin lispro (ADMELOG) corrective regimen sliding scale   SubCutaneous three times a day before meals  metoprolol tartrate 50 milliGRAM(s) Oral two times a day  PARoxetine 10 milliGRAM(s) Oral daily  sodium chloride 0.45%. 1000 milliLiter(s) (100 mL/Hr) IV Continuous <Continuous>  sodium chloride 0.9% lock flush 3 milliLiter(s) IV Push every 8 hours    MEDICATIONS  (PRN):  acetaminophen     Tablet .. 650 milliGRAM(s) Oral every 6 hours PRN Temp greater or equal to 38C (100.4F), Mild Pain (1 - 3)  albuterol/ipratropium for Nebulization 3 milliLiter(s) Nebulizer every 6 hours PRN Shortness of Breath and/or Wheezing  aluminum hydroxide/magnesium hydroxide/simethicone Suspension 30 milliLiter(s) Oral every 4 hours PRN Dyspepsia  dextrose Oral Gel 15 Gram(s) Oral once PRN Blood Glucose LESS THAN 70 milliGRAM(s)/deciliter  melatonin 3 milliGRAM(s) Oral at bedtime PRN Insomnia  nitroglycerin     SubLingual 0.4 milliGRAM(s) SubLingual every 5 minutes PRN Chest Pain  ondansetron Injectable 4 milliGRAM(s) IV Push every 8 hours PRN Nausea and/or Vomiting        Vital Signs Last 24 Hrs  T(C): 36.7 (15 Mar 2023 05:18), Max: 36.9 (14 Mar 2023 23:07)  T(F): 98 (15 Mar 2023 05:18), Max: 98.5 (14 Mar 2023 23:07)  HR: 73 (15 Mar 2023 05:18) (57 - 80)  BP: 151/79 (15 Mar 2023 05:18) (138/83 - 166/93)  BP(mean): 103 (15 Mar 2023 05:18) (103 - 123)  RR: 18 (15 Mar 2023 05:18) (16 - 18)  SpO2: 98% (15 Mar 2023 05:18) (94% - 100%)    Parameters below as of 15 Mar 2023 05:18  Patient On (Oxygen Delivery Method): room air        Daily     Daily Weight in k.5 (15 Mar 2023 06:00)    I&O's Detail    14 Mar 2023 07:01  -  15 Mar 2023 07:00  --------------------------------------------------------  IN:    Oral Fluid: 240 mL  Total IN: 240 mL    OUT:    Voided (mL): 450 mL  Total OUT: 450 mL    Total NET: -210 mL          PHYSICAL EXAM:  Appearance: In NAD  Cardiovascular: Normal S1 S2  Respiratory: Lungs clear to auscultation	  Gastrointestinal:  Soft, Non-tender, + BS, no bruits	  Neurologic: Grossly non-focal.  Extremities: No edema  RRA negative    LABS:                        9.9    7.30  )-----------( 278      ( 15 Mar 2023 05:24 )             28.1     03-15    138  |  103  |  19.3  ----------------------------<  198<H>  3.9   |  25.0  |  2.20<H>    Ca    8.3<L>      15 Mar 2023 05:24    TPro  6.7  /  Alb  3.2<L>  /  TBili  0.3<L>  /  DBili  x   /  AST  18  /  ALT  16  /  AlkPhos  128<H>  03-13    CARDIAC MARKERS ( 14 Mar 2023 06:28 )  x     / <0.01 ng/mL / x     / x     / x      CARDIAC MARKERS ( 13 Mar 2023 22:00 )  x     / <0.01 ng/mL / x     / x     / x          PT/INR - ( 13 Mar 2023 22:00 )   PT: 11.8 sec;   INR: 1.02 ratio         PTT - ( 13 Mar 2023 22:00 )  PTT:26.6 sec    I&O's Summary    14 Mar 2023 07:01  -  15 Mar 2023 07:00  --------------------------------------------------------  IN: 240 mL / OUT: 450 mL / NET: -210 mL       Cath (2023)   Conclusions:   No LV gram due to renal insufficiency   Mild to moderate stenosis of the mid LAD   Normal Ramus and LCX   80% stenosis of the proximal RCA, successfully stented

## 2023-03-15 NOTE — DISCHARGE NOTE NURSING/CASE MANAGEMENT/SOCIAL WORK - PATIENT PORTAL LINK FT
You can access the FollowMyHealth Patient Portal offered by [facility] by registering at the following website: [email or web address]. By joining [facility]’s FollowMyHealth portal, you will also be able to view your health information using other applications (apps) compatible with our system.

## 2023-03-15 NOTE — PROGRESS NOTE ADULT - SUBJECTIVE AND OBJECTIVE BOX
Department of Cardiology                                                                  [facility]/[place] [address] [address]                                                            Telephone: [phone]. Fax:[phone]                                                       INTERVENTIONAL CARDIOLOGY CATHETERIZATION NOTE       Subjective:  52y Male who had a left heart catheterization which showed:  Coronary Angiography   ·	The coronary circulation is right dominant.    LM   ·	Left main artery: The segment is visually normal in size and structure.  LAD   ·	Mid left anterior descending: There is a 40 % stenosis.    CX   ·	Mid circumflex: Angiography shows minor irregularities.    RCA   ·	Proximal right coronary artery: There is an 80 % stenosis. Intravascular ultrasound was performed. Imaging shows minimal cross sectional area of 2.7 mm2. Based on the results, the lesion was judged to be significant and an intervention was performed with a XIENCE SKYPOINT 3 X 38MM MYRNA postdilated with a SAPPHIRE II 3.5 X 15MM balloon.  Ramus   ·	Ramus intermedius: The segment is visually normal in size and structure.  Left Heart Cath   ·	Left ventricular function was not assessed. LV pressures obtained.    Interval History: No chest pain, SOB, or palpitations overnight.    HPI: 51 y/o male with hx of CVA x2 in 10/21 with residual right sided weakness/sensory loss, no dysphagia or use of assistive devices, HTN, HLD, DM-2, CKD-3. Patient was sent into for admission by his Cardiologist after he presented there c/o worsening exertional CP associated with SOB that is better with rest.  EKG in office with inferolateral T wave inversions which are new from prior. Patient denies any cardiac history, no fever, chills, N/V. He denies any changes in medications. In ED repeat EKG confirmed changes seen in Cardiology office. Vitals stable, Trop neg. Denies CP at this time.  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  Hyperlipemia  Type 2 diabetes mellitus  CVA (cerebrovascular accident)  HTN (hypertension)  HLD (hyperlipidemia)  Diabetes mellitus  No significant past surgical history    FAMILY HISTORY:  FH: HTN (hypertension) (Father, Mother)    Allergies  ·	No Known Drug Allergies  ·	shellfish (Anaphylaxis)  ·	shellfish (Short breath)    Home Medications:  aspirin 325 mg oral delayed release tablet: 1 tab(s) orally once a day (22 Oct 2021 18:02)  Aspirin Enteric Coated 325 mg oral delayed release tablet: 1 tab(s) orally once a day (14 Mar 2023 14:48)  atorvastatin 80 mg oral tablet: 1 tab(s) orally once a day (at bedtime) (22 Oct 2021 18:02)  famotidine 20 mg oral tablet: 1 tab(s) orally once a day (22 Oct 2021 18:02)  glimepiride 2 mg oral tablet: 1 tab(s) orally once a day (14 Mar 2023 14:48)  Lantus 100 units/mL subcutaneous solution: 13 unit(s) subcutaneous once a day (14 Mar 2023 14:48)  Lipitor 80 mg oral tablet: 1 tab(s) orally once a day (14 Mar 2023 14:48)  metFORMIN 500 mg oral tablet: 1 tab(s) orally 2 times a day (22 Oct 2021 18:02)  metoprolol tartrate 25 mg oral tablet: 1 tab(s) orally 2 times a day (22 Oct 2021 18:02)  metoprolol tartrate 50 mg oral tablet: 1 tab(s) orally 2 times a day (14 Mar 2023 14:48)  mirtazapine 7.5 mg oral tablet: 1 tab(s) orally once a day (at bedtime) (14 Mar 2023 14:48)  Norvasc 10 mg oral tablet: 1 tab(s) orally once a day (14 Mar 2023 14:48)  PARoxetine 10 mg oral tablet: 1 tab(s) orally once a day (14 Mar 2023 14:48)  PARoxetine 10 mg oral tablet: 1 tab(s) orally once a day (22 Oct 2021 18:02)  Pepcid 20 mg oral tablet: 1 tab(s) orally once a day (14 Mar 2023 14:48)  Vitamin C 500 mg oral tablet: 1 tab(s) orally once a day (14 Mar 2023 14:48)    MEDICATIONS  (STANDING):  acetylcysteine  Oral Solution 1200 milliGRAM(s) Oral every 12 hours  amLODIPine   Tablet 10 milliGRAM(s) Oral daily  aspirin  chewable 81 milliGRAM(s) Oral daily  atorvastatin 80 milliGRAM(s) Oral at bedtime  clopidogrel Tablet 75 milliGRAM(s) Oral daily  dextrose 5%. 1000 milliLiter(s) (100 mL/Hr) IV Continuous <Continuous>  dextrose 5%. 1000 milliLiter(s) (50 mL/Hr) IV Continuous <Continuous>  dextrose 50% Injectable 25 Gram(s) IV Push once  dextrose 50% Injectable 12.5 Gram(s) IV Push once  dextrose 50% Injectable 25 Gram(s) IV Push once  famotidine    Tablet 20 milliGRAM(s) Oral daily  glucagon  Injectable 1 milliGRAM(s) IntraMuscular once  insulin glargine Injectable (LANTUS) 13 Unit(s) SubCutaneous at bedtime  insulin lispro (ADMELOG) corrective regimen sliding scale   SubCutaneous three times a day before meals  metoprolol tartrate 50 milliGRAM(s) Oral two times a day  PARoxetine 10 milliGRAM(s) Oral daily  sodium chloride 0.45%. 1000 milliLiter(s) (100 mL/Hr) IV Continuous <Continuous>  sodium chloride 0.9% lock flush 3 milliLiter(s) IV Push every 8 hours    MEDICATIONS  (PRN):  acetaminophen     Tablet .. 650 milliGRAM(s) Oral every 6 hours PRN Temp greater or equal to 38C (100.4F), Mild Pain (1 - 3)  albuterol/ipratropium for Nebulization 3 milliLiter(s) Nebulizer every 6 hours PRN Shortness of Breath and/or Wheezing  aluminum hydroxide/magnesium hydroxide/simethicone Suspension 30 milliLiter(s) Oral every 4 hours PRN Dyspepsia  dextrose Oral Gel 15 Gram(s) Oral once PRN Blood Glucose LESS THAN 70 milliGRAM(s)/deciliter  melatonin 3 milliGRAM(s) Oral at bedtime PRN Insomnia  nitroglycerin     SubLingual 0.4 milliGRAM(s) SubLingual every 5 minutes PRN Chest Pain  ondansetron Injectable 4 milliGRAM(s) IV Push every 8 hours PRN Nausea and/or Vomiting    Objective:  Vital Signs Last 24 Hrs  T(C): 36.7 (15 Mar 2023 05:18), Max: 36.9 (14 Mar 2023 23:07)  T(F): 98 (15 Mar 2023 05:18), Max: 98.5 (14 Mar 2023 23:07)  HR: 73 (15 Mar 2023 05:18) (57 - 80)  BP: 151/79 (15 Mar 2023 05:18) (138/83 - 166/93)  BP(mean): 103 (15 Mar 2023 05:18) (103 - 123)  RR: 18 (15 Mar 2023 05:18) (16 - 18)  SpO2: 98% (15 Mar 2023 05:18) (94% - 100%)    Parameters below as of 15 Mar 2023 05:18  Patient On (Oxygen Delivery Method): room air    CM: SR  Neuro: A&OX3, CN 2-12 intact  HEENT: NC, AT  Lungs: CTA B/L  CV: S1, S2, no murmur, RRR  Abd: Soft  Extremity: Right wrist: no bleeding, fingers warm with good cap refil    EK.9    7.30  )-----------( 278      ( 15 Mar 2023 05:24 )             28.1     03-15    138  |  103  |  19.3  ----------------------------<  198  3.9   |  25.0  |  2.20    Ca    8.3      15 Mar 2023 05:24    TPro  6.7  /  Alb  3.2  /  TBili  0.3<L>  /  DBili  x   /  AST  18  /  ALT  16  /  AlkPhos  128<H>  03-13    CARDIAC MARKERS ( 14 Mar 2023 06:28 ): <0.01 ng/mL      CARDIAC MARKERS ( 13 Mar 2023 22:00 ): <0.01 ng/mL      PT/INR - ( 13 Mar 2023 22:00 )   PT: 11.8 sec;   INR: 1.02 ratio    PTT - ( 13 Mar 2023 22:00 )  PTT:26.6 sec    Lipids       Total Cholesterol: 188       Triglycerides: 122       HDL: 46       Non-HDL: 142       LDL: 118    HgbA1C: 9.6%                                                                          Department of Cardiology                                                                  [facility]/[place] [address] [address]                                                            Telephone: [phone]. Fax:[phone]                                                       INTERVENTIONAL CARDIOLOGY CATHETERIZATION NOTE       Subjective:  52y Male who had a left heart catheterization which showed:  Coronary Angiography   ·	The coronary circulation is right dominant.    LM   ·	Left main artery: The segment is visually normal in size and structure.  LAD   ·	Mid left anterior descending: There is a 40 % stenosis.    CX   ·	Mid circumflex: Angiography shows minor irregularities.    RCA   ·	Proximal right coronary artery: There is an 80 % stenosis. Intravascular ultrasound was performed. Imaging shows minimal cross sectional area of 2.7 mm2. Based on the results, the lesion was judged to be significant and an intervention was performed with a XIENCE SKYPOINT 3 X 38MM MYRNA postdilated with a SAPPHIRE II 3.5 X 15MM balloon.  Ramus   ·	Ramus intermedius: The segment is visually normal in size and structure.  Left Heart Cath   ·	Left ventricular function was not assessed. LV pressures obtained.    Interval History: No chest pain, SOB, or palpitations overnight.    HPI: 53 y/o male with hx of CVA x2 in 10/21 with residual right sided weakness/sensory loss, no dysphagia or use of assistive devices, HTN, HLD, DM-2, CKD-3. Patient was sent into for admission by his Cardiologist after he presented there c/o worsening exertional CP associated with SOB that is better with rest.  EKG in office with inferolateral T wave inversions which are new from prior. Patient denies any cardiac history, no fever, chills, N/V. He denies any changes in medications. In ED repeat EKG confirmed changes seen in Cardiology office. Vitals stable, Trop neg. Denies CP at this time.  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  Hyperlipemia  Type 2 diabetes mellitus  CVA (cerebrovascular accident)  HTN (hypertension)  HLD (hyperlipidemia)  Diabetes mellitus  No significant past surgical history    FAMILY HISTORY:  FH: HTN (hypertension) (Father, Mother)    Allergies  ·	No Known Drug Allergies  ·	shellfish (Anaphylaxis)  ·	shellfish (Short breath)    Home Medications:  aspirin 325 mg oral delayed release tablet: 1 tab(s) orally once a day (22 Oct 2021 18:02)  Aspirin Enteric Coated 325 mg oral delayed release tablet: 1 tab(s) orally once a day (14 Mar 2023 14:48)  atorvastatin 80 mg oral tablet: 1 tab(s) orally once a day (at bedtime) (22 Oct 2021 18:02)  famotidine 20 mg oral tablet: 1 tab(s) orally once a day (22 Oct 2021 18:02)  glimepiride 2 mg oral tablet: 1 tab(s) orally once a day (14 Mar 2023 14:48)  Lantus 100 units/mL subcutaneous solution: 13 unit(s) subcutaneous once a day (14 Mar 2023 14:48)  Lipitor 80 mg oral tablet: 1 tab(s) orally once a day (14 Mar 2023 14:48)  metFORMIN 500 mg oral tablet: 1 tab(s) orally 2 times a day (22 Oct 2021 18:02)  metoprolol tartrate 25 mg oral tablet: 1 tab(s) orally 2 times a day (22 Oct 2021 18:02)  metoprolol tartrate 50 mg oral tablet: 1 tab(s) orally 2 times a day (14 Mar 2023 14:48)  mirtazapine 7.5 mg oral tablet: 1 tab(s) orally once a day (at bedtime) (14 Mar 2023 14:48)  Norvasc 10 mg oral tablet: 1 tab(s) orally once a day (14 Mar 2023 14:48)  PARoxetine 10 mg oral tablet: 1 tab(s) orally once a day (14 Mar 2023 14:48)  PARoxetine 10 mg oral tablet: 1 tab(s) orally once a day (22 Oct 2021 18:02)  Pepcid 20 mg oral tablet: 1 tab(s) orally once a day (14 Mar 2023 14:48)  Vitamin C 500 mg oral tablet: 1 tab(s) orally once a day (14 Mar 2023 14:48)    MEDICATIONS  (STANDING):  acetylcysteine  Oral Solution 1200 milliGRAM(s) Oral every 12 hours  amLODIPine   Tablet 10 milliGRAM(s) Oral daily  aspirin  chewable 81 milliGRAM(s) Oral daily  atorvastatin 80 milliGRAM(s) Oral at bedtime  clopidogrel Tablet 75 milliGRAM(s) Oral daily  dextrose 5%. 1000 milliLiter(s) (100 mL/Hr) IV Continuous <Continuous>  dextrose 5%. 1000 milliLiter(s) (50 mL/Hr) IV Continuous <Continuous>  dextrose 50% Injectable 25 Gram(s) IV Push once  dextrose 50% Injectable 12.5 Gram(s) IV Push once  dextrose 50% Injectable 25 Gram(s) IV Push once  famotidine    Tablet 20 milliGRAM(s) Oral daily  glucagon  Injectable 1 milliGRAM(s) IntraMuscular once  insulin glargine Injectable (LANTUS) 13 Unit(s) SubCutaneous at bedtime  insulin lispro (ADMELOG) corrective regimen sliding scale   SubCutaneous three times a day before meals  metoprolol tartrate 50 milliGRAM(s) Oral two times a day  PARoxetine 10 milliGRAM(s) Oral daily  sodium chloride 0.45%. 1000 milliLiter(s) (100 mL/Hr) IV Continuous <Continuous>  sodium chloride 0.9% lock flush 3 milliLiter(s) IV Push every 8 hours    MEDICATIONS  (PRN):  acetaminophen     Tablet .. 650 milliGRAM(s) Oral every 6 hours PRN Temp greater or equal to 38C (100.4F), Mild Pain (1 - 3)  albuterol/ipratropium for Nebulization 3 milliLiter(s) Nebulizer every 6 hours PRN Shortness of Breath and/or Wheezing  aluminum hydroxide/magnesium hydroxide/simethicone Suspension 30 milliLiter(s) Oral every 4 hours PRN Dyspepsia  dextrose Oral Gel 15 Gram(s) Oral once PRN Blood Glucose LESS THAN 70 milliGRAM(s)/deciliter  melatonin 3 milliGRAM(s) Oral at bedtime PRN Insomnia  nitroglycerin     SubLingual 0.4 milliGRAM(s) SubLingual every 5 minutes PRN Chest Pain  ondansetron Injectable 4 milliGRAM(s) IV Push every 8 hours PRN Nausea and/or Vomiting    Objective:  Vital Signs Last 24 Hrs  T(C): 36.7 (15 Mar 2023 05:18), Max: 36.9 (14 Mar 2023 23:07)  T(F): 98 (15 Mar 2023 05:18), Max: 98.5 (14 Mar 2023 23:07)  HR: 73 (15 Mar 2023 05:18) (57 - 80)  BP: 151/79 (15 Mar 2023 05:18) (138/83 - 166/93)  BP(mean): 103 (15 Mar 2023 05:18) (103 - 123)  RR: 18 (15 Mar 2023 05:18) (16 - 18)  SpO2: 98% (15 Mar 2023 05:18) (94% - 100%)    Parameters below as of 15 Mar 2023 05:18  Patient On (Oxygen Delivery Method): room air    CM: SR  Neuro: A&OX3, CN 2-12 intact  HEENT: NC, AT  Lungs: CTA B/L  CV: S1, S2, no murmur, RRR  Abd: Soft  Extremity: Right wrist: no bleeding, fingers warm with good cap refil    EKG: NSR, anterolateral T wave abnormality                          9.9    7.30  )-----------( 278      ( 15 Mar 2023 05:24 )             28.1     03-15    138  |  103  |  19.3  ----------------------------<  198  3.9   |  25.0  |  2.20    Ca    8.3      15 Mar 2023 05:24    TPro  6.7  /  Alb  3.2  /  TBili  0.3  /  DBili  x   /  AST  18  /  ALT  16  /  AlkPhos  128  03-13    CARDIAC MARKERS ( 14 Mar 2023 06:28 ): <0.01 ng/mL      CARDIAC MARKERS ( 13 Mar 2023 22:00 ): <0.01 ng/mL      PT/INR - ( 13 Mar 2023 22:00 )   PT: 11.8 sec;   INR: 1.02 ratio    PTT - ( 13 Mar 2023 22:00 )  PTT:26.6 sec    Lipids       Total Cholesterol: 188       Triglycerides: 122       HDL: 46       Non-HDL: 142       LDL: 118    HgbA1C: 9.6%

## 2023-10-06 NOTE — DIETITIAN INITIAL EVALUATION ADULT. - WEIGHT (KG)
Alert-The patient is alert, awake and responds to voice. The patient is oriented to time, place, and person. The triage nurse is able to obtain subjective information.
79.8

## 2023-12-19 NOTE — ED ADULT NURSE NOTE - NSFALLRSKOUTCOME_ED_ALL_ED
What Type Of Note Output Would You Prefer (Optional)?: Standard Output How Severe Are Your Spot(S)?: mild Universal Safety Interventions Have Your Spot(S) Been Treated In The Past?: has not been treated Hpi Title: Evaluation of Skin Lesions

## 2024-10-07 NOTE — PHYSICAL THERAPY INITIAL EVALUATION ADULT - REFERRING PHYSICIAN, REHAB EVAL
Dr. Cantu SSKI Counseling:  I discussed with the patient the risks of SSKI including but not limited to thyroid abnormalities, metallic taste, GI upset, fever, headache, acne, arthralgias, paraesthesias, lymphadenopathy, easy bleeding, arrhythmias, and allergic reaction. Cimetidine Pregnancy And Lactation Text: This medication is Pregnancy Category B and is considered safe during pregnancy. It is also excreted in breast milk and breast feeding isn't recommended. Dapsone Pregnancy And Lactation Text: This medication is Pregnancy Category C and is not considered safe during pregnancy or breast feeding. Ivermectin Counseling:  Patient instructed to take medication on an empty stomach with a full glass of water.  Patient informed of potential adverse effects including but not limited to nausea, diarrhea, dizziness, itching, and swelling of the extremities or lymph nodes.  The patient verbalized understanding of the proper use and possible adverse effects of ivermectin.  All of the patient's questions and concerns were addressed. Protopic Pregnancy And Lactation Text: This medication is Pregnancy Category C. It is unknown if this medication is excreted in breast milk when applied topically. Ilumya Pregnancy And Lactation Text: The risk during pregnancy and breastfeeding is uncertain with this medication. Fluconazole Counseling:  Patient counseled regarding adverse effects of fluconazole including but not limited to headache, diarrhea, nausea, upset stomach, liver function test abnormalities, taste disturbance, and stomach pain.  There is a rare possibility of liver failure that can occur when taking fluconazole.  The patient understands that monitoring of LFTs and kidney function test may be required, especially at baseline. The patient verbalized understanding of the proper use and possible adverse effects of fluconazole.  All of the patient's questions and concerns were addressed. Griseofulvin Pregnancy And Lactation Text: This medication is Pregnancy Category X and is known to cause serious birth defects. It is unknown if this medication is excreted in breast milk but breast feeding should be avoided. Birth Control Pills Counseling: Birth Control Pill Counseling: I discussed with the patient the potential side effects of OCPs including but not limited to increased risk of stroke, heart attack, thrombophlebitis, deep venous thrombosis, hepatic adenomas, breast changes, GI upset, headaches, and depression.  The patient verbalized understanding of the proper use and possible adverse effects of OCPs. All of the patient's questions and concerns were addressed. Tazorac Pregnancy And Lactation Text: This medication is not safe during pregnancy. It is unknown if this medication is excreted in breast milk. Erivedge Pregnancy And Lactation Text: This medication is Pregnancy Category X and is absolutely contraindicated during pregnancy. It is unknown if it is excreted in breast milk. Rifampin Counseling: I discussed with the patient the risks of rifampin including but not limited to liver damage, kidney damage, red-orange body fluids, nausea/vomiting and severe allergy. Methotrexate Counseling:  Patient counseled regarding adverse effects of methotrexate including but not limited to nausea, vomiting, abnormalities in liver function tests. Patients may develop mouth sores, rash, diarrhea, and abnormalities in blood counts. The patient understands that monitoring is required including LFT's and blood counts.  There is a rare possibility of scarring of the liver and lung problems that can occur when taking methotrexate. Persistent nausea, loss of appetite, pale stools, dark urine, cough, and shortness of breath should be reported immediately. Patient advised to discontinue methotrexate treatment at least three months before attempting to become pregnant.  I discussed the need for folate supplements while taking methotrexate.  These supplements can decrease side effects during methotrexate treatment. The patient verbalized understanding of the proper use and possible adverse effects of methotrexate.  All of the patient's questions and concerns were addressed. Finasteride Male Counseling: Finasteride Counseling:  I discussed with the patient the risks of use of finasteride including but not limited to decreased libido, decreased ejaculate volume, gynecomastia, and depression. Women should not handle medication.  All of the patient's questions and concerns were addressed. Rifampin Pregnancy And Lactation Text: This medication is Pregnancy Category C and it isn't know if it is safe during pregnancy. It is also excreted in breast milk and should not be used if you are breast feeding. Clindamycin Pregnancy And Lactation Text: This medication can be used in pregnancy if certain situations. Clindamycin is also present in breast milk. Erythromycin Counseling:  I discussed with the patient the risks of erythromycin including but not limited to GI upset, allergic reaction, drug rash, diarrhea, increase in liver enzymes, and yeast infections. Protopic Counseling: Patient may experience a mild burning sensation during topical application. Protopic is not approved in children less than 2 years of age. There have been case reports of hematologic and skin malignancies in patients using topical calcineurin inhibitors although causality is questionable. Ivermectin Pregnancy And Lactation Text: This medication is Pregnancy Category C and it isn't known if it is safe during pregnancy. It is also excreted in breast milk. Tetracycline Counseling: Patient counseled regarding possible photosensitivity and increased risk for sunburn.  Patient instructed to avoid sunlight, if possible.  When exposed to sunlight, patients should wear protective clothing, sunglasses, and sunscreen.  The patient was instructed to call the office immediately if the following severe adverse effects occur:  hearing changes, easy bruising/bleeding, severe headache, or vision changes.  The patient verbalized understanding of the proper use and possible adverse effects of tetracycline.  All of the patient's questions and concerns were addressed. Patient understands to avoid pregnancy while on therapy due to potential birth defects. Prednisone Counseling:  I discussed with the patient the risks of prolonged use of prednisone including but not limited to weight gain, insomnia, osteoporosis, mood changes, diabetes, susceptibility to infection, glaucoma and high blood pressure.  In cases where prednisone use is prolonged, patients should be monitored with blood pressure checks, serum glucose levels and an eye exam.  Additionally, the patient may need to be placed on GI prophylaxis, PCP prophylaxis, and calcium and vitamin D supplementation and/or a bisphosphonate.  The patient verbalized understanding of the proper use and the possible adverse effects of prednisone.  All of the patient's questions and concerns were addressed. Rhofade Counseling: Rhofade is a topical medication which can decrease superficial blood flow where applied. Side effects are uncommon and include stinging, redness and allergic reactions. Benzoyl Peroxide Pregnancy And Lactation Text: This medication is Pregnancy Category C. It is unknown if benzoyl peroxide is excreted in breast milk. Niacinamide Counseling: I recommended taking niacin or niacinamide, also know as vitamin B3, twice daily. Recent evidence suggests that taking vitamin B3 (500 mg twice daily) can reduce the risk of actinic keratoses and non-melanoma skin cancers. Side effects of vitamin B3 include flushing and headache. Doxycycline Counseling:  Patient counseled regarding possible photosensitivity and increased risk for sunburn.  Patient instructed to avoid sunlight, if possible.  When exposed to sunlight, patients should wear protective clothing, sunglasses, and sunscreen.  The patient was instructed to call the office immediately if the following severe adverse effects occur:  hearing changes, easy bruising/bleeding, severe headache, or vision changes.  The patient verbalized understanding of the proper use and possible adverse effects of doxycycline.  All of the patient's questions and concerns were addressed. Topical Clindamycin Pregnancy And Lactation Text: This medication is Pregnancy Category B and is considered safe during pregnancy. It is unknown if it is excreted in breast milk. Methotrexate Pregnancy And Lactation Text: This medication is Pregnancy Category X and is known to cause fetal harm. This medication is excreted in breast milk. Hydroxychloroquine Pregnancy And Lactation Text: This medication has been shown to cause fetal harm but it isn't assigned a Pregnancy Risk Category. There are small amounts excreted in breast milk. Gabapentin Counseling: I discussed with the patient the risks of gabapentin including but not limited to dizziness, somnolence, fatigue and ataxia. Cephalexin Counseling: I counseled the patient regarding use of cephalexin as an antibiotic for prophylactic and/or therapeutic purposes. Cephalexin (commonly prescribed under brand name Keflex) is a cephalosporin antibiotic which is active against numerous classes of bacteria, including most skin bacteria. Side effects may include nausea, diarrhea, gastrointestinal upset, rash, hives, yeast infections, and in rare cases, hepatitis, kidney disease, seizures, fever, confusion, neurologic symptoms, and others. Patients with severe allergies to penicillin medications are cautioned that there is about a 10% incidence of cross-reactivity with cephalosporins. When possible, patients with penicillin allergies should use alternatives to cephalosporins for antibiotic therapy. 5-Fu Pregnancy And Lactation Text: This medication is Pregnancy Category X and contraindicated in pregnancy and in women who may become pregnant. It is unknown if this medication is excreted in breast milk. Griseofulvin Counseling:  I discussed with the patient the risks of griseofulvin including but not limited to photosensitivity, cytopenia, liver damage, nausea/vomiting and severe allergy.  The patient understands that this medication is best absorbed when taken with a fatty meal (e.g., ice cream or french fries). Minoxidil Counseling: Minoxidil is a topical medication which can increase blood flow where it is applied. It is uncertain how this medication increases hair growth. Side effects are uncommon and include stinging and allergic reactions. Doxepin Pregnancy And Lactation Text: This medication is Pregnancy Category C and it isn't known if it is safe during pregnancy. It is also excreted in breast milk and breast feeding isn't recommended. Doxycycline Pregnancy And Lactation Text: This medication is Pregnancy Category D and not consider safe during pregnancy. It is also excreted in breast milk but is considered safe for shorter treatment courses. Cimzia Pregnancy And Lactation Text: This medication crosses the placenta but can be considered safe in certain situations. Cimzia may be excreted in breast milk. Metronidazole Pregnancy And Lactation Text: This medication is Pregnancy Category B and considered safe during pregnancy.  It is also excreted in breast milk. Ketoconazole Pregnancy And Lactation Text: This medication is Pregnancy Category C and it isn't know if it is safe during pregnancy. It is also excreted in breast milk and breast feeding isn't recommended. Calcipotriene Pregnancy And Lactation Text: This medication has not been proven safe during pregnancy. It is unknown if this medication is excreted in breast milk. Rituxan Pregnancy And Lactation Text: This medication is Pregnancy Category C and it isn't know if it is safe during pregnancy. It is unknown if this medication is excreted in breast milk but similar antibodies are known to be excreted. Cimzia Counseling:  I discussed with the patient the risks of Cimzia including but not limited to immunosuppression, allergic reactions and infections.  The patient understands that monitoring is required including a PPD at baseline and must alert us or the primary physician if symptoms of infection or other concerning signs are noted. Calcipotriene Counseling:  I discussed with the patient the risks of calcipotriene including but not limited to erythema, scaling, itching, and irritation. Enbrel Counseling:  I discussed with the patient the risks of etanercept including but not limited to myelosuppression, immunosuppression, autoimmune hepatitis, demyelinating diseases, lymphoma, and infections.  The patient understands that monitoring is required including a PPD at baseline and must alert us or the primary physician if symptoms of infection or other concerning signs are noted. Valtrex Counseling: I discussed with the patient the risks of valacyclovir including but not limited to kidney damage, nausea, vomiting and severe allergy.  The patient understands that if the infection seems to be worsening or is not improving, they are to call. Cimetidine Counseling:  I discussed with the patient the risks of Cimetidine including but not limited to gynecomastia, headache, diarrhea, nausea, drowsiness, arrhythmias, pancreatitis, skin rashes, psychosis, bone marrow suppression and kidney toxicity. Rituxan Counseling:  I discussed with the patient the risks of Rituxan infusions. Side effects can include infusion reactions, severe drug rashes including mucocutaneous reactions, reactivation of latent hepatitis and other infections and rarely progressive multifocal leukoencephalopathy.  All of the patient's questions and concerns were addressed. Elidel Pregnancy And Lactation Text: This medication is Pregnancy Category C. It is unknown if this medication is excreted in breast milk. Include Pregnancy/Lactation Warning?: No Prednisone Pregnancy And Lactation Text: This medication is Pregnancy Category C and it isn't know if it is safe during pregnancy. This medication is excreted in breast milk. Nsaids Counseling: NSAID Counseling: I discussed with the patient that NSAIDs should be taken with food. Prolonged use of NSAIDs can result in the development of stomach ulcers.  Patient advised to stop taking NSAIDs if abdominal pain occurs.  The patient verbalized understanding of the proper use and possible adverse effects of NSAIDs.  All of the patient's questions and concerns were addressed. Tremfya Counseling: I discussed with the patient the risks of guselkumab including but not limited to immunosuppression, serious infections, worsening of inflammatory bowel disease and drug reactions.  The patient understands that monitoring is required including a PPD at baseline and must alert us or the primary physician if symptoms of infection or other concerning signs are noted. Rhofade Pregnancy And Lactation Text: This medication has not been assigned a Pregnancy Risk Category. It is unknown if the medication is excreted in breast milk. Humira Counseling:  I discussed with the patient the risks of adalimumab including but not limited to myelosuppression, immunosuppression, autoimmune hepatitis, demyelinating diseases, lymphoma, and serious infections.  The patient understands that monitoring is required including a PPD at baseline and must alert us or the primary physician if symptoms of infection or other concerning signs are noted. Terbinafine Counseling: Patient counseling regarding adverse effects of terbinafine including but not limited to headache, diarrhea, rash, upset stomach, liver function test abnormalities, itching, taste/smell disturbance, nausea, abdominal pain, and flatulence.  There is a rare possibility of liver failure that can occur when taking terbinafine.  The patient understands that a baseline LFT and kidney function test may be required. The patient verbalized understanding of the proper use and possible adverse effects of terbinafine.  All of the patient's questions and concerns were addressed. Stelara Counseling:  I discussed with the patient the risks of ustekinumab including but not limited to immunosuppression, malignancy, posterior leukoencephalopathy syndrome, and serious infections.  The patient understands that monitoring is required including a PPD at baseline and must alert us or the primary physician if symptoms of infection or other concerning signs are noted. Quinolones Pregnancy And Lactation Text: This medication is Pregnancy Category C and it isn't know if it is safe during pregnancy. It is also excreted in breast milk. Thalidomide Counseling: I discussed with the patient the risks of thalidomide including but not limited to birth defects, anxiety, weakness, chest pain, dizziness, cough and severe allergy. Tazorac Counseling:  Patient advised that medication is irritating and drying.  Patient may need to apply sparingly and wash off after an hour before eventually leaving it on overnight.  The patient verbalized understanding of the proper use and possible adverse effects of tazorac.  All of the patient's questions and concerns were addressed. Benzoyl Peroxide Counseling: Patient counseled that medicine may cause skin irritation and bleach clothing.  In the event of skin irritation, the patient was advised to reduce the amount of the drug applied or use it less frequently.   The patient verbalized understanding of the proper use and possible adverse effects of benzoyl peroxide.  All of the patient's questions and concerns were addressed. Drysol Counseling:  I discussed with the patient the risks of drysol/aluminum chloride including but not limited to skin rash, itching, irritation, burning. Solaraze Pregnancy And Lactation Text: This medication is Pregnancy Category B and is considered safe. There is some data to suggest avoiding during the third trimester. It is unknown if this medication is excreted in breast milk. Finasteride Pregnancy And Lactation Text: This medication is absolutely contraindicated during pregnancy. It is unknown if it is excreted in breast milk. Otezla Pregnancy And Lactation Text: This medication is Pregnancy Category C and it isn't known if it is safe during pregnancy. It is unknown if it is excreted in breast milk. Solaraze Counseling:  I discussed with the patient the risks of Solaraze including but not limited to erythema, scaling, itching, weeping, crusting, and pain. Siliq Counseling:  I discussed with the patient the risks of Siliq including but not limited to new or worsening depression, suicidal thoughts and behavior, immunosuppression, malignancy, posterior leukoencephalopathy syndrome, and serious infections.  The patient understands that monitoring is required including a PPD at baseline and must alert us or the primary physician if symptoms of infection or other concerning signs are noted. There is also a special program designed to monitor depression which is required with Siliq. Tetracycline Pregnancy And Lactation Text: This medication is Pregnancy Category D and not consider safe during pregnancy. It is also excreted in breast milk. Odomzo Counseling- I discussed with the patient the risks of Odomzo including but not limited to nausea, vomiting, diarrhea, constipation, weight loss, changes in the sense of taste, decreased appetite, muscle spasms, and hair loss.  The patient verbalized understanding of the proper use and possible adverse effects of Odomzo.  All of the patient's questions and concerns were addressed. Clofazimine Counseling:  I discussed with the patient the risks of clofazimine including but not limited to skin and eye pigmentation, liver damage, nausea/vomiting, gastrointestinal bleeding and allergy. Xeljanz Counseling: I discussed with the patient the risks of Xeljanz therapy including increased risk of infection, liver issues, headache, diarrhea, or cold symptoms. Live vaccines should be avoided. They were instructed to call if they have any problems. Bactrim Counseling:  I discussed with the patient the risks of sulfa antibiotics including but not limited to GI upset, allergic reaction, drug rash, diarrhea, dizziness, photosensitivity, and yeast infections.  Rarely, more serious reactions can occur including but not limited to aplastic anemia, agranulocytosis, methemoglobinemia, blood dyscrasias, liver or kidney failure, lung infiltrates or desquamative/blistering drug rashes. Propranolol Counseling:  I discussed with the patient the risks of propranolol including but not limited to low heart rate, low blood pressure, low blood sugar, restlessness and increased cold sensitivity. They should call the office if they experience any of these side effects. 5-Fu Counseling: 5-Fluorouracil Counseling:  I discussed with the patient the risks of 5-fluorouracil including but not limited to erythema, scaling, itching, weeping, crusting, and pain. Acitretin Counseling:  I discussed with the patient the risks of acitretin including but not limited to hair loss, dry lips/skin/eyes, liver damage, hyperlipidemia, depression/suicidal ideation, photosensitivity.  Serious rare side effects can include but are not limited to pancreatitis, pseudotumor cerebri, bony changes, clot formation/stroke/heart attack.  Patient understands that alcohol is contraindicated since it can result in liver toxicity and significantly prolong the elimination of the drug by many years. Topical Sulfur Applications Counseling: Topical Sulfur Counseling: Patient counseled that this medication may cause skin irritation or allergic reactions.  In the event of skin irritation, the patient was advised to reduce the amount of the drug applied or use it less frequently.   The patient verbalized understanding of the proper use and possible adverse effects of topical sulfur application.  All of the patient's questions and concerns were addressed. Spironolactone Counseling: Patient advised regarding risks of diarrhea, abdominal pain, hyperkalemia, birth defects (for female patients), liver toxicity and renal toxicity. The patient may need blood work to monitor liver and kidney function and potassium levels while on therapy. The patient verbalized understanding of the proper use and possible adverse effects of spironolactone.  All of the patient's questions and concerns were addressed. Bexarotene Pregnancy And Lactation Text: This medication is Pregnancy Category X and should not be given to women who are pregnant or may become pregnant. This medication should not be used if you are breast feeding. Xolair Counseling:  Patient informed of potential adverse effects including but not limited to fever, muscle aches, rash and allergic reactions.  The patient verbalized understanding of the proper use and possible adverse effects of Xolair.  All of the patient's questions and concerns were addressed. Ketoconazole Counseling:   Patient counseled regarding improving absorption with orange juice.  Adverse effects include but are not limited to breast enlargement, headache, diarrhea, nausea, upset stomach, liver function test abnormalities, taste disturbance, and stomach pain.  There is a rare possibility of liver failure that can occur when taking ketoconazole. The patient understands that monitoring of LFTs may be required, especially at baseline. The patient verbalized understanding of the proper use and possible adverse effects of ketoconazole.  All of the patient's questions and concerns were addressed. Cosentyx Counseling:  I discussed with the patient the risks of Cosentyx including but not limited to worsening of Crohn's disease, immunosuppression, allergic reactions and infections.  The patient understands that monitoring is required including a PPD at baseline and must alert us or the primary physician if symptoms of infection or other concerning signs are noted. Dupixent Pregnancy And Lactation Text: This medication likely crosses the placenta but the risk for the fetus is uncertain. This medication is excreted in breast milk. Metronidazole Counseling:  I discussed with the patient the risks of metronidazole including but not limited to seizures, nausea/vomiting, a metallic taste in the mouth, nausea/vomiting and severe allergy. Wartpeel Counseling:  I discussed with the patient the risks of Wartpeel including but not limited to erythema, scaling, itching, weeping, crusting, and pain. Otezla Counseling: The side effects of Otezla were discussed with the patient, including but not limited to worsening or new depression, weight loss, diarrhea, nausea, upper respiratory tract infection, and headache. Patient instructed to call the office should any adverse effect occur.  The patient verbalized understanding of the proper use and possible adverse effects of Otezla.  All the patient's questions and concerns were addressed. Topical Sulfur Applications Pregnancy And Lactation Text: This medication is Pregnancy Category C and has an unknown safety profile during pregnancy. It is unknown if this topical medication is excreted in breast milk. Isotretinoin Pregnancy And Lactation Text: This medication is Pregnancy Category X and is considered extremely dangerous during pregnancy. It is unknown if it is excreted in breast milk. Quinolones Counseling:  I discussed with the patient the risks of fluoroquinolones including but not limited to GI upset, allergic reaction, drug rash, diarrhea, dizziness, photosensitivity, yeast infections, liver function test abnormalities, tendonitis/tendon rupture. Azithromycin Pregnancy And Lactation Text: This medication is considered safe during pregnancy and is also secreted in breast milk. Spironolactone Pregnancy And Lactation Text: This medication can cause feminization of the male fetus and should be avoided during pregnancy. The active metabolite is also found in breast milk. Bexarotene Counseling:  I discussed with the patient the risks of bexarotene including but not limited to hair loss, dry lips/skin/eyes, liver abnormalities, hyperlipidemia, pancreatitis, depression/suicidal ideation, photosensitivity, drug rash/allergic reactions, hypothyroidism, anemia, leukopenia, infection, cataracts, and teratogenicity.  Patient understands that they will need regular blood tests to check lipid profile, liver function tests, white blood cell count, thyroid function tests and pregnancy test if applicable. Clindamycin Counseling: I counseled the patient regarding use of clindamycin as an antibiotic for prophylactic and/or therapeutic purposes. Clindamycin is active against numerous classes of bacteria, including skin bacteria. Side effects may include nausea, diarrhea, gastrointestinal upset, rash, hives, yeast infections, and in rare cases, colitis. Taltz Counseling: I discussed with the patient the risks of ixekizumab including but not limited to immunosuppression, serious infections, worsening of inflammatory bowel disease and drug reactions.  The patient understands that monitoring is required including a PPD at baseline and must alert us or the primary physician if symptoms of infection or other concerning signs are noted. Nsaids Pregnancy And Lactation Text: These medications are considered safe up to 30 weeks gestation. It is excreted in breast milk. Ilumya Counseling: I discussed with the patient the risks of tildrakizumab including but not limited to immunosuppression, malignancy, posterior leukoencephalopathy syndrome, and serious infections.  The patient understands that monitoring is required including a PPD at baseline and must alert us or the primary physician if symptoms of infection or other concerning signs are noted. Topical Clindamycin Counseling: Patient counseled that this medication may cause skin irritation or allergic reactions.  In the event of skin irritation, the patient was advised to reduce the amount of the drug applied or use it less frequently.   The patient verbalized understanding of the proper use and possible adverse effects of clindamycin.  All of the patient's questions and concerns were addressed. Detail Level: Zone Cellcept Pregnancy And Lactation Text: This medication is Pregnancy Category D and isn't considered safe during pregnancy. It is unknown if this medication is excreted in breast milk. Dapsone Counseling: I discussed with the patient the risks of dapsone including but not limited to hemolytic anemia, agranulocytosis, rashes, methemoglobinemia, kidney failure, peripheral neuropathy, headaches, GI upset, and liver toxicity.  Patients who start dapsone require monitoring including baseline LFTs and weekly CBCs for the first month, then every month thereafter.  The patient verbalized understanding of the proper use and possible adverse effects of dapsone.  All of the patient's questions and concerns were addressed. Imiquimod Counseling:  I discussed with the patient the risks of imiquimod including but not limited to erythema, scaling, itching, weeping, crusting, and pain.  Patient understands that the inflammatory response to imiquimod is variable from person to person and was educated regarded proper titration schedule.  If flu-like symptoms develop, patient knows to discontinue the medication and contact us. Elidel Counseling: Patient may experience a mild burning sensation during topical application. Elidel is not approved in children less than 2 years of age. There have been case reports of hematologic and skin malignancies in patients using topical calcineurin inhibitors although causality is questionable. Drysol Pregnancy And Lactation Text: This medication is considered safe during pregnancy and breast feeding. Minoxidil Pregnancy And Lactation Text: This medication has not been assigned a Pregnancy Risk Category but animal studies failed to show danger with the topical medication. It is unknown if the medication is excreted in breast milk. Gabapentin Pregnancy And Lactation Text: This medication is Pregnancy Category C and isn't considered safe during pregnancy. It is excreted in breast milk. Cyclosporine Counseling:  I discussed with the patient the risks of cyclosporine including but not limited to hypertension, gingival hyperplasia,myelosuppression, immunosuppression, liver damage, kidney damage, neurotoxicity, lymphoma, and serious infections. The patient understands that monitoring is required including baseline blood pressure, CBC, CMP, lipid panel and uric acid, and then 1-2 times monthly CMP and blood pressure. Cosentyx Pregnancy And Lactation Text: This medication is Pregnancy Category B and is considered safe during pregnancy. It is unknown if this medication is excreted in breast milk. Sski Pregnancy And Lactation Text: This medication is Pregnancy Category D and isn't considered safe during pregnancy. It is excreted in breast milk. Eucrisa Counseling: Patient may experience a mild burning sensation during topical application. Eucrisa is not approved in children less than 2 years of age. Simponi Counseling:  I discussed with the patient the risks of golimumab including but not limited to myelosuppression, immunosuppression, autoimmune hepatitis, demyelinating diseases, lymphoma, and serious infections.  The patient understands that monitoring is required including a PPD at baseline and must alert us or the primary physician if symptoms of infection or other concerning signs are noted. Opioid Counseling: I discussed with the patient the potential side effects of opioids including but not limited to addiction, altered mental status, and depression. I stressed avoiding alcohol, benzodiazepines, muscle relaxants and sleep aids unless specifically okayed by a physician. The patient verbalized understanding of the proper use and possible adverse effects of opioids. All of the patient's questions and concerns were addressed. They were instructed to flush the remaining pills down the toilet if they did not need them for pain. Cellcept Counseling:  I discussed with the patient the risks of mycophenolate mofetil including but not limited to infection/immunosuppression, GI upset, hypokalemia, hypercholesterolemia, bone marrow suppression, lymphoproliferative disorders, malignancy, GI ulceration/bleed/perforation, colitis, interstitial lung disease, kidney failure, progressive multifocal leukoencephalopathy, and birth defects.  The patient understands that monitoring is required including a baseline creatinine and regular CBC testing. In addition, patient must alert us immediately if symptoms of infection or other concerning signs are noted. Mirvaso Counseling: Mirvaso is a topical medication which can decrease superficial blood flow where applied. Side effects are uncommon and include stinging, redness and allergic reactions. Cyclophosphamide Counseling:  I discussed with the patient the risks of cyclophosphamide including but not limited to hair loss, hormonal abnormalities, decreased fertility, abdominal pain, diarrhea, nausea and vomiting, bone marrow suppression and infection. The patient understands that monitoring is required while taking this medication. Glycopyrrolate Counseling:  I discussed with the patient the risks of glycopyrrolate including but not limited to skin rash, drowsiness, dry mouth, difficulty urinating, and blurred vision. Hydroxyzine Counseling: Patient advised that the medication is sedating and not to drive a car after taking this medication.  Patient informed of potential adverse effects including but not limited to dry mouth, urinary retention, and blurry vision.  The patient verbalized understanding of the proper use and possible adverse effects of hydroxyzine.  All of the patient's questions and concerns were addressed. Niacinamide Pregnancy And Lactation Text: These medications are considered safe during pregnancy. Bactrim Pregnancy And Lactation Text: This medication is Pregnancy Category D and is known to cause fetal risk.  It is also excreted in breast milk. Glycopyrrolate Pregnancy And Lactation Text: This medication is Pregnancy Category B and is considered safe during pregnancy. It is unknown if it is excreted breast milk. High Dose Vitamin A Pregnancy And Lactation Text: High dose vitamin A therapy is contraindicated during pregnancy and breast feeding. Erivedge Counseling- I discussed with the patient the risks of Erivedge including but not limited to nausea, vomiting, diarrhea, constipation, weight loss, changes in the sense of taste, decreased appetite, muscle spasms, and hair loss.  The patient verbalized understanding of the proper use and possible adverse effects of Erivedge.  All of the patient's questions and concerns were addressed. Cyclophosphamide Pregnancy And Lactation Text: This medication is Pregnancy Category D and it isn't considered safe during pregnancy. This medication is excreted in breast milk. Hydroxyzine Pregnancy And Lactation Text: This medication is not safe during pregnancy and should not be taken. It is also excreted in breast milk and breast feeding isn't recommended. Dupixent Counseling: I discussed with the patient the risks of dupilumab including but not limited to eye infection and irritation, cold sores, injection site reactions, worsening of asthma, allergic reactions and increased risk of parasitic infection.  Live vaccines should be avoided while taking dupilumab. Dupilumab will also interact with certain medications such as warfarin and cyclosporine. The patient understands that monitoring is required and they must alert us or the primary physician if symptoms of infection or other concerning signs are noted. Oxybutynin Counseling:  I discussed with the patient the risks of oxybutynin including but not limited to skin rash, drowsiness, dry mouth, difficulty urinating, and blurred vision. Hydroxychloroquine Counseling:  I discussed with the patient that a baseline ophthalmologic exam is needed at the start of therapy and every year thereafter while on therapy. A CBC may also be warranted for monitoring.  The side effects of this medication were discussed with the patient, including but not limited to agranulocytosis, aplastic anemia, seizures, rashes, retinopathy, and liver toxicity. Patient instructed to call the office should any adverse effect occur.  The patient verbalized understanding of the proper use and possible adverse effects of Plaquenil.  All the patient's questions and concerns were addressed. Albendazole Counseling:  I discussed with the patient the risks of albendazole including but not limited to cytopenia, kidney damage, nausea/vomiting and severe allergy.  The patient understands that this medication is being used in an off-label manner. Azithromycin Counseling:  I discussed with the patient the risks of azithromycin including but not limited to GI upset, allergic reaction, drug rash, diarrhea, and yeast infections. Azathioprine Counseling:  I discussed with the patient the risks of azathioprine including but not limited to myelosuppression, immunosuppression, hepatotoxicity, lymphoma, and infections.  The patient understands that monitoring is required including baseline LFTs, Creatinine, possible TPMP genotyping and weekly CBCs for the first month and then every 2 weeks thereafter.  The patient verbalized understanding of the proper use and possible adverse effects of azathioprine.  All of the patient's questions and concerns were addressed. High Dose Vitamin A Counseling: Side effects reviewed, pt to contact office should one occur. Propranolol Pregnancy And Lactation Text: This medication is Pregnancy Category C and it isn't known if it is safe during pregnancy. It is excreted in breast milk. Isotretinoin Counseling: Patient should get monthly blood tests, not donate blood, not drive at night if vision affected, not share medication, and not undergo elective surgery for 6 months after tx completed. Side effects reviewed, pt to contact office should one occur. Zyclara Counseling:  I discussed with the patient the risks of imiquimod including but not limited to erythema, scaling, itching, weeping, crusting, and pain.  Patient understands that the inflammatory response to imiquimod is variable from person to person and was educated regarded proper titration schedule.  If flu-like symptoms develop, patient knows to discontinue the medication and contact us. Xelrenatoz Pregnancy And Lactation Text: This medication is Pregnancy Category D and is not considered safe during pregnancy.  The risk during breast feeding is also uncertain. Colchicine Counseling:  Patient counseled regarding adverse effects including but not limited to stomach upset (nausea, vomiting, stomach pain, or diarrhea).  Patient instructed to limit alcohol consumption while taking this medication.  Colchicine may reduce blood counts especially with prolonged use.  The patient understands that monitoring of kidney function and blood counts may be required, especially at baseline. The patient verbalized understanding of the proper use and possible adverse effects of colchicine.  All of the patient's questions and concerns were addressed. Picato Counseling:  I discussed with the patient the risks of Picato including but not limited to erythema, scaling, itching, weeping, crusting, and pain. Topical Retinoid counseling:  Patient advised to apply a pea-sized amount only at bedtime and wait 30 minutes after washing their face before applying.  If too drying, patient may add a non-comedogenic moisturizer. The patient verbalized understanding of the proper use and possible adverse effects of retinoids.  All of the patient's questions and concerns were addressed. Opioid Pregnancy And Lactation Text: These medications can lead to premature delivery and should be avoided during pregnancy. These medications are also present in breast milk in small amounts. Tranexamic Acid Pregnancy And Lactation Text: It is unknown if this medication is safe during pregnancy or breast feeding. Arava Counseling:  Patient counseled regarding adverse effects of Arava including but not limited to nausea, vomiting, abnormalities in liver function tests. Patients may develop mouth sores, rash, diarrhea, and abnormalities in blood counts. The patient understands that monitoring is required including LFTs and blood counts.  There is a rare possibility of scarring of the liver and lung problems that can occur when taking methotrexate. Persistent nausea, loss of appetite, pale stools, dark urine, cough, and shortness of breath should be reported immediately. Patient advised to discontinue Arava treatment and consult with a physician prior to attempting conception. The patient will have to undergo a treatment to eliminate Arava from the body prior to conception. Cephalexin Pregnancy And Lactation Text: This medication is Pregnancy Category B and considered safe during pregnancy.  It is also excreted in breast milk but can be used safely for shorter doses. Skyrizi Counseling: I discussed with the patient the risks of risankizumab-rzaa including but not limited to immunosuppression, and serious infections.  The patient understands that monitoring is required including a PPD at baseline and must alert us or the primary physician if symptoms of infection or other concerning signs are noted. Acitretin Pregnancy And Lactation Text: This medication is Pregnancy Category X and should not be given to women who are pregnant or may become pregnant in the future. This medication is excreted in breast milk. Hydroquinone Counseling:  Patient advised that medication may result in skin irritation, lightening (hypopigmentation), dryness, and burning.  In the event of skin irritation, the patient was advised to reduce the amount of the drug applied or use it less frequently.  Rarely, spots that are treated with hydroquinone can become darker (pseudoochronosis).  Should this occur, patient instructed to stop medication and call the office. The patient verbalized understanding of the proper use and possible adverse effects of hydroquinone.  All of the patient's questions and concerns were addressed. Doxepin Counseling:  Patient advised that the medication is sedating and not to drive a car after taking this medication. Patient informed of potential adverse effects including but not limited to dry mouth, urinary retention, and blurry vision.  The patient verbalized understanding of the proper use and possible adverse effects of doxepin.  All of the patient's questions and concerns were addressed. Xolair Pregnancy And Lactation Text: This medication is Pregnancy Category B and is considered safe during pregnancy. This medication is excreted in breast milk. Itraconazole Counseling:  I discussed with the patient the risks of itraconazole including but not limited to liver damage, nausea/vomiting, neuropathy, and severe allergy.  The patient understands that this medication is best absorbed when taken with acidic beverages such as non-diet cola or ginger ale.  The patient understands that monitoring is required including baseline LFTs and repeat LFTs at intervals.  The patient understands that they are to contact us or the primary physician if concerning signs are noted. Valtrex Pregnancy And Lactation Text: this medication is Pregnancy Category B and is considered safe during pregnancy. This medication is not directly found in breast milk but it's metabolite acyclovir is present. Minocycline Counseling: Patient advised regarding possible photosensitivity and discoloration of the teeth, skin, lips, tongue and gums.  Patient instructed to avoid sunlight, if possible.  When exposed to sunlight, patients should wear protective clothing, sunglasses, and sunscreen.  The patient was instructed to call the office immediately if the following severe adverse effects occur:  hearing changes, easy bruising/bleeding, severe headache, or vision changes.  The patient verbalized understanding of the proper use and possible adverse effects of minocycline.  All of the patient's questions and concerns were addressed. Infliximab Counseling:  I discussed with the patient the risks of infliximab including but not limited to myelosuppression, immunosuppression, autoimmune hepatitis, demyelinating diseases, lymphoma, and serious infections.  The patient understands that monitoring is required including a PPD at baseline and must alert us or the primary physician if symptoms of infection or other concerning signs are noted. Sarecycline Counseling: Patient advised regarding possible photosensitivity and discoloration of the teeth, skin, lips, tongue and gums.  Patient instructed to avoid sunlight, if possible.  When exposed to sunlight, patients should wear protective clothing, sunglasses, and sunscreen.  The patient was instructed to call the office immediately if the following severe adverse effects occur:  hearing changes, easy bruising/bleeding, severe headache, or vision changes.  The patient verbalized understanding of the proper use and possible adverse effects of sarecycline.  All of the patient's questions and concerns were addressed. Carac Counseling:  I discussed with the patient the risks of Carac including but not limited to erythema, scaling, itching, weeping, crusting, and pain. Birth Control Pills Pregnancy And Lactation Text: This medication should be avoided if pregnant and for the first 30 days post-partum. Erythromycin Pregnancy And Lactation Text: This medication is Pregnancy Category B and is considered safe during pregnancy. It is also excreted in breast milk. Tranexamic Acid Counseling:  Patient advised of the small risk of bleeding problems with tranexamic acid. They were also instructed to call if they developed any nausea, vomiting or diarrhea. All of the patient's questions and concerns were addressed.

## 2025-01-09 NOTE — ED PROVIDER NOTE - CCCP TRG CHIEF CMPLNT
Call returned. [facility] staff was gone at the time I received that message and called back. I am routing this message to you ladies for further assistance if needed. Thank you.    Per [facility],     Alivia Chavez Staff  Phone Number: [phone]     Hi,  Received a home health referral for this patient. [facility] as [facility]. Has this [facility] been [facility] to the [facility] for [facility]?  Alivia Chavez LPN  
abnormal EKG/chest pain

## 2025-05-01 NOTE — PHYSICAL THERAPY INITIAL EVALUATION ADULT - SOCIAL CONCERNS
Patient was a no call no show to the scheduled 2pm appointment with this provider today. Provider called the patient at 2:02pm, but they did not answer. At the time of this writing, provider has not heard back from the patient.    
None